# Patient Record
Sex: FEMALE | Race: WHITE | NOT HISPANIC OR LATINO | Employment: OTHER | URBAN - METROPOLITAN AREA
[De-identification: names, ages, dates, MRNs, and addresses within clinical notes are randomized per-mention and may not be internally consistent; named-entity substitution may affect disease eponyms.]

---

## 2018-07-09 ENCOUNTER — CONVERSION ENCOUNTER (OUTPATIENT)
Dept: GENERAL RADIOLOGY | Facility: HOSPITAL | Age: 63
End: 2018-07-09

## 2019-05-29 ENCOUNTER — HOSPITAL ENCOUNTER (OUTPATIENT)
Dept: SURGERY | Facility: HOSPITAL | Age: 64
Setting detail: HOSPITAL OUTPATIENT SURGERY
Discharge: HOME | End: 2019-05-29
Attending: OPHTHALMOLOGY

## 2019-06-19 ENCOUNTER — HOSPITAL ENCOUNTER (OUTPATIENT)
Dept: SURGERY | Facility: HOSPITAL | Age: 64
Setting detail: HOSPITAL OUTPATIENT SURGERY
Discharge: HOME | End: 2019-06-19
Attending: OPHTHALMOLOGY

## 2021-12-28 ENCOUNTER — LAB REQUISITION (OUTPATIENT)
Dept: LAB | Facility: HOSPITAL | Age: 66
End: 2021-12-28

## 2021-12-28 DIAGNOSIS — Z12.4 ENCOUNTER FOR SCREENING FOR MALIGNANT NEOPLASM OF CERVIX: ICD-10-CM

## 2021-12-28 DIAGNOSIS — Z01.419 ENCOUNTER FOR GYNECOLOGICAL EXAMINATION (GENERAL) (ROUTINE) WITHOUT ABNORMAL FINDINGS: ICD-10-CM

## 2021-12-28 PROCEDURE — G0123 SCREEN CERV/VAG THIN LAYER: HCPCS | Performed by: NURSE PRACTITIONER

## 2022-01-03 LAB
CONV .: NORMAL
CYTOLOGIST CVX/VAG CYTO: NORMAL
CYTOLOGY CVX/VAG DOC CYTO: NORMAL
CYTOLOGY CVX/VAG DOC THIN PREP: NORMAL
DX ICD CODE: NORMAL
HIV 1 & 2 AB SER-IMP: NORMAL
OTHER STN SPEC: NORMAL
STAT OF ADQ CVX/VAG CYTO-IMP: NORMAL

## 2022-01-12 ENCOUNTER — OFFICE VISIT (OUTPATIENT)
Dept: OBSTETRICS AND GYNECOLOGY | Facility: CLINIC | Age: 67
End: 2022-01-12

## 2022-01-12 VITALS
HEART RATE: 61 BPM | HEIGHT: 61 IN | WEIGHT: 203 LBS | BODY MASS INDEX: 38.33 KG/M2 | SYSTOLIC BLOOD PRESSURE: 148 MMHG | DIASTOLIC BLOOD PRESSURE: 83 MMHG

## 2022-01-12 DIAGNOSIS — R93.89 THICKENED ENDOMETRIUM: Primary | ICD-10-CM

## 2022-01-12 DIAGNOSIS — N95.0 PMB (POSTMENOPAUSAL BLEEDING): ICD-10-CM

## 2022-01-12 PROCEDURE — 88305 TISSUE EXAM BY PATHOLOGIST: CPT | Performed by: OBSTETRICS & GYNECOLOGY

## 2022-01-12 PROCEDURE — 99213 OFFICE O/P EST LOW 20 MIN: CPT | Performed by: OBSTETRICS & GYNECOLOGY

## 2022-01-12 PROCEDURE — 58100 BIOPSY OF UTERUS LINING: CPT | Performed by: OBSTETRICS & GYNECOLOGY

## 2022-01-12 RX ORDER — LOVASTATIN 20 MG/1
20 TABLET ORAL NIGHTLY
COMMUNITY
Start: 2021-12-28

## 2022-01-12 RX ORDER — CHOLECALCIFEROL (VITAMIN D3) 125 MCG
1 CAPSULE ORAL DAILY
COMMUNITY

## 2022-01-12 RX ORDER — UREA 10 %
1 LOTION (ML) TOPICAL 2 TIMES WEEKLY
COMMUNITY

## 2022-01-12 RX ORDER — ALLOPURINOL 100 MG/1
100 TABLET ORAL NIGHTLY
COMMUNITY
Start: 2021-12-28

## 2022-01-12 RX ORDER — LISINOPRIL AND HYDROCHLOROTHIAZIDE 12.5; 1 MG/1; MG/1
1 TABLET ORAL NIGHTLY
COMMUNITY
Start: 2021-12-28

## 2022-01-12 NOTE — PROGRESS NOTES
"GYN new patient    CC: PMB    Tobacco/Nicotine use:  Yes    HPI:   66 y.o. Contraception or HRT: Post menopausal  Pt states she had 7 days of bleeding over [place].  Has been in menopause for the past 12 years.  No h/o HRT.  No diabetes. No h/o oligomenorrhea.  No h/o abnormal pap smear.  Was never told she had a fibroid in the uterus.  States menses were heavy for 48 hours initially then normal flow    History: PMHx, Meds, Allergies, PSHx, Social Hx, and POBHx all reviewed and updated.  PCP:Ava Darby, APRN      Review of Systems     /83   Pulse 61   Ht 154.9 cm (61\")   Wt 92.1 kg (203 lb)   Breastfeeding No   BMI 38.36 kg/m²     Physical Exam  Vitals and nursing note reviewed. Exam conducted with a chaperone present.   Constitutional:       Appearance: Normal appearance.   Neck:      Thyroid: No thyroid mass or thyromegaly.   Cardiovascular:      Rate and Rhythm: Normal rate and regular rhythm.      Heart sounds: Normal heart sounds.   Pulmonary:      Effort: Pulmonary effort is normal.      Breath sounds: Normal breath sounds.   Abdominal:      General: Abdomen is flat. Bowel sounds are normal.      Palpations: Abdomen is soft.   Genitourinary:     General: Normal vulva.      Exam position: Lithotomy position.      Labia:         Right: No lesion.         Left: No lesion.       Urethra: No prolapse or urethral lesion.      Vagina: Bleeding present.      Cervix: Cervical bleeding present.      Uterus: Enlarged. Not tender and no uterine prolapse.       Adnexa: Right adnexa normal and left adnexa normal.      Comments: Office procedure Timeout was performed  Patient gave written consent for endometrial biopsy  The speculum was placed into the vagina with the above-noted findings  The cervix was prepped with Betadine  Single-tooth tenaculum was applied to the anterior lip of the cervix  The uterus sounded to 10 cm  Multiple passes were made with the endometrial [place]  Specimen was " sent to pathology  All instruments were removed from the vagina excellent hemostasis was observed and the patient tolerated procedure well  Musculoskeletal:      Cervical back: Full passive range of motion without pain.   Neurological:      Mental Status: She is alert.         ASSESSMENT AND PLAN:  Problem Visit    Diagnoses and all orders for this visit:    1. Thickened endometrium (Primary)  Comments:  Endometrial biopsy was performed in the office today.  Discussed the possibility of needing hysteroscopy D&C in the future  Orders:  -     Endometrial Biopsy    2. PMB (postmenopausal bleeding)  Comments:  Endometrial biopsy was performed in the office today  Orders:  -     Endometrial Biopsy    Other orders  -     SCANNED - IMAGING        Counseling:     Patient was counseled regarding risk of infection and bleeding postbiopsy              Follow Up:  Return in about 1 week (around 1/19/2022).        Kimmy Jaffe MD  01/12/2022

## 2022-01-14 LAB
CYTO UR: NORMAL
LAB AP CASE REPORT: NORMAL
LAB AP CLINICAL INFORMATION: NORMAL
PATH REPORT.FINAL DX SPEC: NORMAL
PATH REPORT.GROSS SPEC: NORMAL

## 2022-01-18 ENCOUNTER — OFFICE VISIT (OUTPATIENT)
Dept: OBSTETRICS AND GYNECOLOGY | Facility: CLINIC | Age: 67
End: 2022-01-18

## 2022-01-18 ENCOUNTER — PREP FOR SURGERY (OUTPATIENT)
Dept: OTHER | Facility: HOSPITAL | Age: 67
End: 2022-01-18

## 2022-01-18 VITALS
WEIGHT: 201 LBS | DIASTOLIC BLOOD PRESSURE: 85 MMHG | SYSTOLIC BLOOD PRESSURE: 133 MMHG | BODY MASS INDEX: 37.98 KG/M2 | HEART RATE: 70 BPM

## 2022-01-18 DIAGNOSIS — R93.89 THICKENED ENDOMETRIUM: Primary | ICD-10-CM

## 2022-01-18 DIAGNOSIS — N95.0 PMB (POSTMENOPAUSAL BLEEDING): ICD-10-CM

## 2022-01-18 DIAGNOSIS — R93.89 THICKENED ENDOMETRIUM: ICD-10-CM

## 2022-01-18 DIAGNOSIS — N95.0 PMB (POSTMENOPAUSAL BLEEDING): Primary | ICD-10-CM

## 2022-01-18 LAB
BASOPHILS # BLD AUTO: 0.08 10*3/MM3 (ref 0–0.2)
BASOPHILS NFR BLD AUTO: 0.7 % (ref 0–1.5)
DEPRECATED RDW RBC AUTO: 41.2 FL (ref 37–54)
EOSINOPHIL # BLD AUTO: 0.36 10*3/MM3 (ref 0–0.4)
EOSINOPHIL NFR BLD AUTO: 3.1 % (ref 0.3–6.2)
ERYTHROCYTE [DISTWIDTH] IN BLOOD BY AUTOMATED COUNT: 12 % (ref 12.3–15.4)
HCT VFR BLD AUTO: 52.6 % (ref 34–46.6)
HGB BLD-MCNC: 18 G/DL (ref 12–15.9)
IMM GRANULOCYTES # BLD AUTO: 0.06 10*3/MM3 (ref 0–0.05)
IMM GRANULOCYTES NFR BLD AUTO: 0.5 % (ref 0–0.5)
LYMPHOCYTES # BLD AUTO: 3.96 10*3/MM3 (ref 0.7–3.1)
LYMPHOCYTES NFR BLD AUTO: 33.7 % (ref 19.6–45.3)
MCH RBC QN AUTO: 32 PG (ref 26.6–33)
MCHC RBC AUTO-ENTMCNC: 34.2 G/DL (ref 31.5–35.7)
MCV RBC AUTO: 93.4 FL (ref 79–97)
MONOCYTES # BLD AUTO: 1.04 10*3/MM3 (ref 0.1–0.9)
MONOCYTES NFR BLD AUTO: 8.9 % (ref 5–12)
NEUTROPHILS NFR BLD AUTO: 53.1 % (ref 42.7–76)
NEUTROPHILS NFR BLD AUTO: 6.24 10*3/MM3 (ref 1.7–7)
NRBC BLD AUTO-RTO: 0 /100 WBC (ref 0–0.2)
PLATELET # BLD AUTO: 363 10*3/MM3 (ref 140–450)
PMV BLD AUTO: 11 FL (ref 6–12)
RBC # BLD AUTO: 5.63 10*6/MM3 (ref 3.77–5.28)
WBC NRBC COR # BLD: 11.74 10*3/MM3 (ref 3.4–10.8)

## 2022-01-18 PROCEDURE — 99214 OFFICE O/P EST MOD 30 MIN: CPT | Performed by: OBSTETRICS & GYNECOLOGY

## 2022-01-18 PROCEDURE — 85025 COMPLETE CBC W/AUTO DIFF WBC: CPT | Performed by: OBSTETRICS & GYNECOLOGY

## 2022-01-18 RX ORDER — SODIUM CHLORIDE, SODIUM LACTATE, POTASSIUM CHLORIDE, CALCIUM CHLORIDE 600; 310; 30; 20 MG/100ML; MG/100ML; MG/100ML; MG/100ML
125 INJECTION, SOLUTION INTRAVENOUS CONTINUOUS
Status: CANCELLED | OUTPATIENT
Start: 2022-01-18

## 2022-01-18 RX ORDER — SODIUM CHLORIDE 0.9 % (FLUSH) 0.9 %
3 SYRINGE (ML) INJECTION EVERY 12 HOURS SCHEDULED
Status: CANCELLED | OUTPATIENT
Start: 2022-01-18

## 2022-01-18 RX ORDER — CEFAZOLIN SODIUM 2 G/100ML
2 INJECTION, SOLUTION INTRAVENOUS ONCE
Status: CANCELLED | OUTPATIENT
Start: 2022-01-18 | End: 2022-01-18

## 2022-01-18 RX ORDER — ACETAMINOPHEN 500 MG
1000 TABLET ORAL ONCE
Status: CANCELLED | OUTPATIENT
Start: 2022-01-18 | End: 2022-01-18

## 2022-01-18 RX ORDER — SODIUM CHLORIDE 0.9 % (FLUSH) 0.9 %
10 SYRINGE (ML) INJECTION AS NEEDED
Status: CANCELLED | OUTPATIENT
Start: 2022-01-18

## 2022-01-18 NOTE — ASSESSMENT & PLAN NOTE
Explained the need for tissue diagnosis as endometrial biopsy performed on 1/12/2022 showed only endocervical tissue

## 2022-01-18 NOTE — PROGRESS NOTES
Date: 22   Pre-Operative Visit  Patient Name: Maritza Armstrong         : 1955    Allergies: No Known Allergies    Age: 66 y.o.   A  LMP: No LMP recorded. Patient is postmenopausal. Birth Control: Post menopausal    CC:   Chief Complaint   Patient presents with   • FU EMBX results   Postmenopausal bleeding    HPI:  Patient had an endometrial biopsy performed last week in the office. The biopsy contains mostly blood and endocervical tissue. No endometrial tissue was obtained. There is no evidence of hyperplasia or malignancy on the tissue obtained. Patient states she is continuing to have a light amount of bleeding.    Past Medical History:   Diagnosis Date   • Anemia    • Gout    • Hyperlipidemia    • Hypertension        [x] Reviewed intake/office visit 2022  [x] Reviewed labs, US/imaging, pap, pathology 2022    Vitals: /85   Pulse 70   Wt 91.2 kg (201 lb)   BMI 37.98 kg/m²      Physical Exam  Vitals and nursing note reviewed.   Constitutional:       Appearance: Normal appearance.   Cardiovascular:      Rate and Rhythm: Normal rate and regular rhythm.      Heart sounds: Normal heart sounds.   Pulmonary:      Effort: Pulmonary effort is normal.      Breath sounds: Normal breath sounds.   Abdominal:      General: Abdomen is flat. Bowel sounds are normal.      Palpations: Abdomen is soft.   Neurological:      Mental Status: She is alert.         Diagnoses and all orders for this visit:    1. Thickened endometrium (Primary)  Assessment & Plan:  Explained the need for tissue diagnosis as endometrial biopsy performed on 2022 showed only endocervical tissue    Orders:  -     CBC and Differential    2. PMB (postmenopausal bleeding)  Assessment & Plan:  Endometrial biopsy did not have endometrial tissue  Counseled the patient and her daughter at length regarding the risks and benefits of hysteroscopy D&C as well as the need to proceed with hysteroscopy D&C    Orders:  -     CBC and  Differential    Reviewed planned procedure in detail along with risks, benefits, alternatives, side-effects and efficacy.  Appropriate surgical and postop expectations reviewed.  See separate counseling note.      [] Preoperative Clearance      Follow-up: Return for 2 weeks post op.               Kimmy Jaffe MD

## 2022-01-18 NOTE — ASSESSMENT & PLAN NOTE
Endometrial biopsy did not have endometrial tissue  Counseled the patient and her daughter at length regarding the risks and benefits of hysteroscopy D&C as well as the need to proceed with hysteroscopy D&C

## 2022-01-28 ENCOUNTER — PRE-ADMISSION TESTING (OUTPATIENT)
Dept: PREADMISSION TESTING | Facility: HOSPITAL | Age: 67
End: 2022-01-28

## 2022-01-28 ENCOUNTER — LAB (OUTPATIENT)
Dept: LAB | Facility: HOSPITAL | Age: 67
End: 2022-01-28

## 2022-01-28 VITALS
OXYGEN SATURATION: 94 % | RESPIRATION RATE: 18 BRPM | HEIGHT: 61 IN | SYSTOLIC BLOOD PRESSURE: 116 MMHG | BODY MASS INDEX: 38.46 KG/M2 | TEMPERATURE: 97.8 F | HEART RATE: 58 BPM | DIASTOLIC BLOOD PRESSURE: 68 MMHG | WEIGHT: 203.71 LBS

## 2022-01-28 DIAGNOSIS — R93.89 THICKENED ENDOMETRIUM: ICD-10-CM

## 2022-01-28 DIAGNOSIS — N95.0 PMB (POSTMENOPAUSAL BLEEDING): Primary | ICD-10-CM

## 2022-01-28 DIAGNOSIS — N95.0 PMB (POSTMENOPAUSAL BLEEDING): ICD-10-CM

## 2022-01-28 LAB
BILIRUB UR QL STRIP: NEGATIVE
CLARITY UR: CLEAR
COLOR UR: YELLOW
GLUCOSE UR STRIP-MCNC: NEGATIVE MG/DL
HGB UR QL STRIP.AUTO: NEGATIVE
KETONES UR QL STRIP: NEGATIVE
LEUKOCYTE ESTERASE UR QL STRIP.AUTO: NEGATIVE
NITRITE UR QL STRIP: NEGATIVE
PH UR STRIP.AUTO: 7 [PH] (ref 5–8)
PROT UR QL STRIP: NEGATIVE
SP GR UR STRIP: 1.01 (ref 1–1.03)
UROBILINOGEN UR QL STRIP: NORMAL

## 2022-01-28 PROCEDURE — U0005 INFEC AGEN DETEC AMPLI PROBE: HCPCS

## 2022-01-28 PROCEDURE — U0004 COV-19 TEST NON-CDC HGH THRU: HCPCS

## 2022-01-28 PROCEDURE — 81003 URINALYSIS AUTO W/O SCOPE: CPT

## 2022-01-28 PROCEDURE — C9803 HOPD COVID-19 SPEC COLLECT: HCPCS

## 2022-01-28 RX ORDER — IBUPROFEN 800 MG/1
800 TABLET ORAL EVERY 8 HOURS PRN
COMMUNITY

## 2022-01-29 LAB — SARS-COV-2 RNA PNL SPEC NAA+PROBE: NOT DETECTED

## 2022-02-02 PROCEDURE — S0260 H&P FOR SURGERY: HCPCS | Performed by: OBSTETRICS & GYNECOLOGY

## 2022-02-02 NOTE — H&P
[facility]   PREOPERATIVE HISTORY AND PHYSICAL    Patient Name:Maritza Armstrong  : 1955  MRN: [id number]  Primary Care Physician: Ava Darby APRN  Date of admission: (Not on file)    Subjective   Subjective     Chief Complaint: preoperative evaluation for PMB and thickened endometrium    History of Present Illness  Maritza Armstorng is a 66 y.o. female who presents for preoperative evaluation. She is scheduled for HYSTEROSCOPY ENDOMETRIAL BIOPSY / POLYPECTOMY (N/A)    Review of Systems     Personal History     Past Medical History:   Diagnosis Date   • Elevated hemoglobin (HCC)     HX OF    • Gout    • Hyperlipidemia    • Hypertension    • Low iron     HX OF, CURRENTLY TAKES IRON TABS   • PMB (postmenopausal bleeding)    • Thickened endometrium        Past Surgical History:   Procedure Laterality Date   •  SECTION     • D & C WITH SUCTION     • FINGER SURGERY Bilateral    • WISDOM TOOTH EXTRACTION         Family History: Her family history is not on file.     Social History: She  reports that she has been smoking. She has a 67.50 pack-year smoking history. She has never used smokeless tobacco. She reports previous alcohol use. She reports that she does not use drugs.    Home Medications:  Vitamin D3, allopurinol, ferrous sulfate, ibuprofen, lisinopril-hydrochlorothiazide, and lovastatin    Allergies:  She has No Known Allergies.    Objective    Objective     Vitals:         Physical Exam  Constitutional:       Appearance: Normal appearance.   Cardiovascular:      Rate and Rhythm: Normal rate and regular rhythm.      Heart sounds: Normal heart sounds.   Pulmonary:      Effort: Pulmonary effort is normal.      Breath sounds: Normal breath sounds.   Abdominal:      General: Abdomen is flat. Bowel sounds are normal.      Palpations: Abdomen is soft.   Neurological:      Mental Status: She is alert.         Assessment/Plan   Assessment / Plan     Brief Patient Summary:  Mraitza Armstrong is a 66  y.o. female who presents for preoperative evaluation.    Pre-Op Diagnosis Codes:     * PMB (postmenopausal bleeding) [N95.0]     * Thickened endometrium [R93.89]    Active Hospital Problems:  Active Hospital Problems    Diagnosis    • PMB (postmenopausal bleeding)    • Thickened endometrium      Plan:   Procedure(s):  HYSTEROSCOPY ENDOMETRIAL BIOPSY / POLYPECTOMY    The risks, benefits, and alternatives of the procedure including but not limited to risk of infection, uterine perforation, bleeding, and need for further surgery and risks of the anesthesia were discussed in detail with the patient and questions were answered. No guarantees were made or implied. Informed consent was obtained.    Kimmy Jaffe MD

## 2022-02-03 ENCOUNTER — ANESTHESIA (OUTPATIENT)
Dept: PERIOP | Facility: HOSPITAL | Age: 67
End: 2022-02-03

## 2022-02-03 ENCOUNTER — HOSPITAL ENCOUNTER (OUTPATIENT)
Facility: HOSPITAL | Age: 67
Setting detail: HOSPITAL OUTPATIENT SURGERY
Discharge: HOME | End: 2022-02-03
Attending: OBSTETRICS & GYNECOLOGY | Admitting: OBSTETRICS & GYNECOLOGY

## 2022-02-03 ENCOUNTER — ANESTHESIA EVENT (OUTPATIENT)
Dept: PERIOP | Facility: HOSPITAL | Age: 67
End: 2022-02-03

## 2022-02-03 VITALS
HEART RATE: 66 BPM | BODY MASS INDEX: 37.2 KG/M2 | TEMPERATURE: 97.6 F | HEIGHT: 62 IN | DIASTOLIC BLOOD PRESSURE: 60 MMHG | SYSTOLIC BLOOD PRESSURE: 118 MMHG | WEIGHT: 202.16 LBS | OXYGEN SATURATION: 96 % | RESPIRATION RATE: 14 BRPM

## 2022-02-03 DIAGNOSIS — N95.0 PMB (POSTMENOPAUSAL BLEEDING): ICD-10-CM

## 2022-02-03 DIAGNOSIS — R93.89 THICKENED ENDOMETRIUM: ICD-10-CM

## 2022-02-03 PROCEDURE — 58558 HYSTEROSCOPY BIOPSY: CPT | Performed by: OBSTETRICS & GYNECOLOGY

## 2022-02-03 PROCEDURE — 25010000002 METOCLOPRAMIDE PER 10 MG: Performed by: ANESTHESIOLOGY

## 2022-02-03 PROCEDURE — 25010000002 PROPOFOL 10 MG/ML EMULSION: Performed by: NURSE ANESTHETIST, CERTIFIED REGISTERED

## 2022-02-03 PROCEDURE — 25010000002 MIDAZOLAM PER 1 MG: Performed by: ANESTHESIOLOGY

## 2022-02-03 PROCEDURE — 88305 TISSUE EXAM BY PATHOLOGIST: CPT | Performed by: OBSTETRICS & GYNECOLOGY

## 2022-02-03 PROCEDURE — 0 CEFAZOLIN IN DEXTROSE 2-4 GM/100ML-% SOLUTION: Performed by: OBSTETRICS & GYNECOLOGY

## 2022-02-03 RX ORDER — PROMETHAZINE HYDROCHLORIDE 12.5 MG/1
25 TABLET ORAL ONCE AS NEEDED
Status: DISCONTINUED | OUTPATIENT
Start: 2022-02-03 | End: 2022-02-03 | Stop reason: HOSPADM

## 2022-02-03 RX ORDER — SODIUM CHLORIDE 0.9 % (FLUSH) 0.9 %
3 SYRINGE (ML) INJECTION EVERY 12 HOURS SCHEDULED
Status: DISCONTINUED | OUTPATIENT
Start: 2022-02-03 | End: 2022-02-03 | Stop reason: HOSPADM

## 2022-02-03 RX ORDER — MAGNESIUM HYDROXIDE 1200 MG/15ML
LIQUID ORAL AS NEEDED
Status: DISCONTINUED | OUTPATIENT
Start: 2022-02-03 | End: 2022-02-03 | Stop reason: HOSPADM

## 2022-02-03 RX ORDER — ACETAMINOPHEN 500 MG
1000 TABLET ORAL ONCE
Status: DISCONTINUED | OUTPATIENT
Start: 2022-02-03 | End: 2022-02-03 | Stop reason: HOSPADM

## 2022-02-03 RX ORDER — OXYCODONE HYDROCHLORIDE AND ACETAMINOPHEN 5; 325 MG/1; MG/1
1 TABLET ORAL ONCE AS NEEDED
Status: DISCONTINUED | OUTPATIENT
Start: 2022-02-03 | End: 2022-02-03 | Stop reason: HOSPADM

## 2022-02-03 RX ORDER — PROMETHAZINE HYDROCHLORIDE 25 MG/1
25 SUPPOSITORY RECTAL ONCE AS NEEDED
Status: DISCONTINUED | OUTPATIENT
Start: 2022-02-03 | End: 2022-02-03 | Stop reason: HOSPADM

## 2022-02-03 RX ORDER — LIDOCAINE HYDROCHLORIDE 20 MG/ML
INJECTION, SOLUTION INFILTRATION; PERINEURAL AS NEEDED
Status: DISCONTINUED | OUTPATIENT
Start: 2022-02-03 | End: 2022-02-03 | Stop reason: SURG

## 2022-02-03 RX ORDER — MEPERIDINE HYDROCHLORIDE 25 MG/ML
12.5 INJECTION INTRAMUSCULAR; INTRAVENOUS; SUBCUTANEOUS
Status: DISCONTINUED | OUTPATIENT
Start: 2022-02-03 | End: 2022-02-03 | Stop reason: HOSPADM

## 2022-02-03 RX ORDER — METOCLOPRAMIDE HYDROCHLORIDE 5 MG/ML
10 INJECTION INTRAMUSCULAR; INTRAVENOUS ONCE AS NEEDED
Status: COMPLETED | OUTPATIENT
Start: 2022-02-03 | End: 2022-02-03

## 2022-02-03 RX ORDER — GLYCOPYRROLATE 0.2 MG/ML
0.2 INJECTION INTRAMUSCULAR; INTRAVENOUS
Status: COMPLETED | OUTPATIENT
Start: 2022-02-03 | End: 2022-02-03

## 2022-02-03 RX ORDER — KETAMINE HCL IN NACL, ISO-OSM 100MG/10ML
SYRINGE (ML) INJECTION AS NEEDED
Status: DISCONTINUED | OUTPATIENT
Start: 2022-02-03 | End: 2022-02-03 | Stop reason: SURG

## 2022-02-03 RX ORDER — FAMOTIDINE 10 MG/ML
20 INJECTION, SOLUTION INTRAVENOUS
Status: COMPLETED | OUTPATIENT
Start: 2022-02-03 | End: 2022-02-03

## 2022-02-03 RX ORDER — SODIUM CHLORIDE, SODIUM LACTATE, POTASSIUM CHLORIDE, CALCIUM CHLORIDE 600; 310; 30; 20 MG/100ML; MG/100ML; MG/100ML; MG/100ML
9 INJECTION, SOLUTION INTRAVENOUS CONTINUOUS PRN
Status: DISCONTINUED | OUTPATIENT
Start: 2022-02-03 | End: 2022-02-03 | Stop reason: HOSPADM

## 2022-02-03 RX ORDER — MIDAZOLAM HYDROCHLORIDE 1 MG/ML
2 INJECTION INTRAMUSCULAR; INTRAVENOUS ONCE
Status: COMPLETED | OUTPATIENT
Start: 2022-02-03 | End: 2022-02-03

## 2022-02-03 RX ORDER — SODIUM CHLORIDE, SODIUM LACTATE, POTASSIUM CHLORIDE, CALCIUM CHLORIDE 600; 310; 30; 20 MG/100ML; MG/100ML; MG/100ML; MG/100ML
125 INJECTION, SOLUTION INTRAVENOUS CONTINUOUS
Status: DISCONTINUED | OUTPATIENT
Start: 2022-02-03 | End: 2022-02-03 | Stop reason: HOSPADM

## 2022-02-03 RX ORDER — ONDANSETRON 2 MG/ML
4 INJECTION INTRAMUSCULAR; INTRAVENOUS ONCE AS NEEDED
Status: DISCONTINUED | OUTPATIENT
Start: 2022-02-03 | End: 2022-02-03 | Stop reason: HOSPADM

## 2022-02-03 RX ORDER — SODIUM CHLORIDE 0.9 % (FLUSH) 0.9 %
10 SYRINGE (ML) INJECTION AS NEEDED
Status: DISCONTINUED | OUTPATIENT
Start: 2022-02-03 | End: 2022-02-03 | Stop reason: HOSPADM

## 2022-02-03 RX ORDER — ACETAMINOPHEN 500 MG
1000 TABLET ORAL ONCE
Status: COMPLETED | OUTPATIENT
Start: 2022-02-03 | End: 2022-02-03

## 2022-02-03 RX ORDER — CEFAZOLIN SODIUM 2 G/100ML
2 INJECTION, SOLUTION INTRAVENOUS ONCE
Status: COMPLETED | OUTPATIENT
Start: 2022-02-03 | End: 2022-02-03

## 2022-02-03 RX ORDER — OXYCODONE HYDROCHLORIDE 5 MG/1
5 TABLET ORAL
Status: DISCONTINUED | OUTPATIENT
Start: 2022-02-03 | End: 2022-02-03 | Stop reason: HOSPADM

## 2022-02-03 RX ORDER — BUPIVACAINE HYDROCHLORIDE AND EPINEPHRINE 5; 5 MG/ML; UG/ML
INJECTION, SOLUTION EPIDURAL; INTRACAUDAL; PERINEURAL AS NEEDED
Status: DISCONTINUED | OUTPATIENT
Start: 2022-02-03 | End: 2022-02-03 | Stop reason: HOSPADM

## 2022-02-03 RX ADMIN — SODIUM CHLORIDE, POTASSIUM CHLORIDE, SODIUM LACTATE AND CALCIUM CHLORIDE 9 ML/HR: 600; 310; 30; 20 INJECTION, SOLUTION INTRAVENOUS at 06:40

## 2022-02-03 RX ADMIN — FAMOTIDINE 20 MG: 10 INJECTION INTRAVENOUS at 06:58

## 2022-02-03 RX ADMIN — LIDOCAINE HYDROCHLORIDE 100 MG: 20 INJECTION, SOLUTION INFILTRATION; PERINEURAL at 07:33

## 2022-02-03 RX ADMIN — MIDAZOLAM HYDROCHLORIDE 2 MG: 1 INJECTION, SOLUTION INTRAMUSCULAR; INTRAVENOUS at 07:23

## 2022-02-03 RX ADMIN — METOCLOPRAMIDE 10 MG: 5 INJECTION, SOLUTION INTRAMUSCULAR; INTRAVENOUS at 07:23

## 2022-02-03 RX ADMIN — PROPOFOL 200 MCG/KG/MIN: 10 INJECTION, EMULSION INTRAVENOUS at 07:33

## 2022-02-03 RX ADMIN — Medication 50 MG: at 07:33

## 2022-02-03 RX ADMIN — ACETAMINOPHEN 1000 MG: 500 TABLET ORAL at 06:58

## 2022-02-03 RX ADMIN — CEFAZOLIN SODIUM 2 G: 2 INJECTION, SOLUTION INTRAVENOUS at 07:32

## 2022-02-03 RX ADMIN — OXYCODONE HYDROCHLORIDE 5 MG: 5 TABLET ORAL at 08:27

## 2022-02-03 RX ADMIN — GLYCOPYRROLATE 0.2 MG: 0.2 INJECTION INTRAMUSCULAR; INTRAVENOUS at 07:23

## 2022-02-03 NOTE — DISCHARGE INSTRUCTIONS
DISCHARGE INSTRUCTIONS  SURGICAL / AMBULATORY  PROCEDURES Maritza Armstrong     ? For your surgery you had:  ? General anesthesia (you may have a sore throat for the first 24 hours)  ? IV sedation.  ? Local anesthesia  ? Monitored anesthesia Care  ? You received a medicated patch for nausea prevention today (behind your ear). It is recommended that you remove it 24-48 hours post-operatively. It must be removed within 72 hours.   ? You have received an anesthesia medication today that can cause hormonal forms of birth control to be ineffective. You should use a different form of birth control (to prevent pregnancy) for 7 days.   ? You may experience dizziness, drowsiness, or light-headedness for several hours following surgery/procedure.  ? Do not stay alone today or tonight.  ? Limit your activity for 24 hours.  ? Resume your diet slowly.  Follow whatever special dietary instructions you may have been given by your doctor.  ? You should not drive or operate machinery, drink alcohol, or sign legally binding documents for 24 hours or while you are taking pain medication.    NOTIFY YOUR DOCTOR IF YOU EXPERIENCE ANY OF THE FOLLOWING:  ? Temperature greater than 101 degrees Fahrenheit  ? Shaking Chills  ? Redness or excessive drainage from incision  ? Nausea, vomiting and/or pain that is not controlled by prescribed medications  ? Increase in bleeding or bleeding that is excessive  ? Unable to urinate in 6 hours after surgery  ? If unable to reach your doctor, please go to the closest Emergency Room    Bleeding and cramping equivalent to a menstrual cramps will occur for next 48 h.    Warm showers may help    Try to limit lifting heavier items for the next couple days.    Ibuprofen 600 mg every 8 h for 3 days then stop    Last dose of pain medication was given at: 0830

## 2022-02-03 NOTE — ANESTHESIA PREPROCEDURE EVALUATION
Anesthesia Evaluation     Patient summary reviewed and Nursing notes reviewed   history of anesthetic complications: difficult airway               Airway   Mallampati: III  TM distance: >3 FB  Neck ROM: full  No difficulty expected and Difficult intubation highly probable  Dental      Pulmonary - negative pulmonary ROS and normal exam    breath sounds clear to auscultation  Cardiovascular - normal exam    Rhythm: regular  Rate: normal    (+) hypertension,       Neuro/Psych- negative ROS  GI/Hepatic/Renal/Endo    (+) obesity,       Musculoskeletal (-) negative ROS    Abdominal    Substance History - negative use     OB/GYN negative ob/gyn ROS         Other                      Anesthesia Plan    ASA 2     general     intravenous induction     Anesthetic plan, all risks, benefits, and alternatives have been provided, discussed and informed consent has been obtained with: patient.        CODE STATUS:

## 2022-02-03 NOTE — OP NOTE
DILATATION AND CURETTAGE HYSTEROSCOPY WITH MYOSURE  Procedure Report    Patient Name:  Maritza Armstrong  YOB: 1955    Date of Surgery:  2/3/2022         Pre-op Diagnosis:   PMB (postmenopausal bleeding) [N95.0]  Thickened endometrium [R93.89]       Post-Op Diagnosis Codes:     * PMB (postmenopausal bleeding) [N95.0]     * Thickened endometrium [R93.89]    Procedure/CPT® Codes:      Procedure(s):  HYSTEROSCOPY, DILATION AND CURETTAGE    Staff:  Surgeon(s):  Kimmy Jaffe MD         Anesthesia: Choice    Estimated Blood Loss: minimal        Specimen:          Specimens     ID Source Type Tests Collected By Collected At Frozen?    A Uterus Tissue · TISSUE PATHOLOGY EXAM   Kimmy Jaffe MD 2/3/22 2093     Description: ENDOMETRIAL CURETTINGS               Findings: Endometrial cavity grossly normal.  Bilateral tubal ostia were visualized and there is no evidence of uterine perforation.  Endometrium was atrophic.  No evidence of endometrial polyp    Complications: None    Description of Procedure: Pt was taken to the operating room and placed under General anesthesia via LMA. She was placed in low dorsal lithotomy in yellow fin stirrups and prepped and draped in usual sterile fashion. I was called to the room following completion of draping. A surgical time out was performed. A speculum was placed in the vagina and the anterior lip of the cervix was grasped with a single tooth tenaculum. The uterus was gently sounded to a depth of 6. A paracervical block was performed with half percent Marcaine with epinephrine approximately 5 mL bilaterally.  The hysteroscope was gently introduced into the endometrial cavity with the above noted findings. The hysteroscope was withdrawn and multiple passes were made with a sharp curette.  The single-tooth tenaculum was removed from the anterior lip of the cervix.  The speculum was withdrawn.  Excellent hemostasis was observed. All instruments were removed from the  vagina. All lap, sponge, and needle counts were correct x 3. The pt tolerated the procedure well and went to the recovery room in stable condition.       Kimmy Jaffe MD     Date: 2/3/2022  Time: 08:15 EST

## 2022-02-03 NOTE — ANESTHESIA POSTPROCEDURE EVALUATION
Patient: Maritza Armstrong    Procedure Summary     Date: 02/03/22 Room / Location: [facility] OR 02 / [facility] MAIN OR    Anesthesia Start: 0727 Anesthesia Stop: 0808    Procedure: HYSTEROSCOPY, DILATION AND CURETTAGE (N/A Vagina) Diagnosis:       PMB (postmenopausal bleeding)      Thickened endometrium      (PMB (postmenopausal bleeding) [N95.0])      (Thickened endometrium [R93.89])    Surgeons: Kimmy Jaffe MD Provider: Satnam Le MD    Anesthesia Type: general ASA Status: 2          Anesthesia Type: general    Vitals  Vitals Value Taken Time   /62 02/03/22 0848   Temp 36.4 °C (97.5 °F) 02/03/22 0848   Pulse 67 02/03/22 0848   Resp 16 02/03/22 0848   SpO2 96 % 02/03/22 0848           Post Anesthesia Care and Evaluation    Patient location during evaluation: bedside  Patient participation: complete - patient participated  Level of consciousness: awake  Pain management: adequate  Airway patency: patent  Anesthetic complications: No anesthetic complications  PONV Status: none  Cardiovascular status: acceptable and stable  Respiratory status: acceptable  Hydration status: acceptable    Comments: An Anesthesiologist personally participated in the most demanding procedures (including induction and emergence if applicable) in the anesthesia plan, monitored the course of anesthesia administration at frequent intervals and remained physically present and available for immediate diagnosis and treatment of emergencies.

## 2022-02-16 ENCOUNTER — OFFICE VISIT (OUTPATIENT)
Dept: OBSTETRICS AND GYNECOLOGY | Facility: CLINIC | Age: 67
End: 2022-02-16

## 2022-02-16 VITALS
BODY MASS INDEX: 36.65 KG/M2 | SYSTOLIC BLOOD PRESSURE: 121 MMHG | WEIGHT: 200.4 LBS | DIASTOLIC BLOOD PRESSURE: 77 MMHG | HEART RATE: 67 BPM

## 2022-02-16 DIAGNOSIS — R93.89 THICKENED ENDOMETRIUM: ICD-10-CM

## 2022-02-16 DIAGNOSIS — N95.0 PMB (POSTMENOPAUSAL BLEEDING): Primary | ICD-10-CM

## 2022-02-16 PROCEDURE — 99212 OFFICE O/P EST SF 10 MIN: CPT | Performed by: OBSTETRICS & GYNECOLOGY

## 2022-02-16 NOTE — PROGRESS NOTES
Post Op Office Visit  CC:    Chief Complaint   Patient presents with   • Post-op      PMB  Thickened endometrium    HPI:  No complaints  Operative report, surgical findings and any pathology reviewed.    PHYSICAL EXAM:  /77   Pulse 67   Wt 90.9 kg (200 lb 6.4 oz)   BMI 36.65 kg/m²   General- NAD, alert and oriented, appropriate  Psych- Normal mood, good memory    Abdomen- Soft, non distended, non tender, no masses  Incision/s-  Clean, dry, intact, healing well     ASSESSMENT and PLAN:  Post-operative exam    Diagnoses and all orders for this visit:    1. PMB (postmenopausal bleeding) (Primary)  Assessment & Plan:  Has had some light bleeding since D&C  No bleeding at this time  Pathology benign      2. Thickened endometrium  Assessment & Plan:  No endometrial polyp at time of surgery  Pathology benign            Any available photos and/or pathology were reviewed.  All questions answered.     Ok to resume normal activities  Ok to resume intercourse  Return to school/work without limitations    Counseling:         Follow Up:  No follow-ups on file.            Kimmy Jaffe MD  02/16/2022

## 2022-08-09 ENCOUNTER — APPOINTMENT (OUTPATIENT)
Dept: GENERAL RADIOLOGY | Facility: HOSPITAL | Age: 67
End: 2022-08-09

## 2022-08-09 ENCOUNTER — HOSPITAL ENCOUNTER (EMERGENCY)
Facility: HOSPITAL | Age: 67
Discharge: HOME | End: 2022-08-09
Attending: STUDENT IN AN ORGANIZED HEALTH CARE EDUCATION/TRAINING PROGRAM | Admitting: STUDENT IN AN ORGANIZED HEALTH CARE EDUCATION/TRAINING PROGRAM

## 2022-08-09 ENCOUNTER — APPOINTMENT (OUTPATIENT)
Dept: CT IMAGING | Facility: HOSPITAL | Age: 67
End: 2022-08-09

## 2022-08-09 VITALS
RESPIRATION RATE: 20 BRPM | SYSTOLIC BLOOD PRESSURE: 163 MMHG | BODY MASS INDEX: 35.94 KG/M2 | TEMPERATURE: 98.8 F | HEART RATE: 75 BPM | OXYGEN SATURATION: 96 % | DIASTOLIC BLOOD PRESSURE: 92 MMHG | HEIGHT: 63 IN | WEIGHT: 202.82 LBS

## 2022-08-09 DIAGNOSIS — S09.90XA INJURY OF HEAD, INITIAL ENCOUNTER: ICD-10-CM

## 2022-08-09 DIAGNOSIS — M25.531 RIGHT WRIST PAIN: ICD-10-CM

## 2022-08-09 DIAGNOSIS — T07.XXXA ABRASIONS OF MULTIPLE SITES: ICD-10-CM

## 2022-08-09 DIAGNOSIS — S50.01XA CONTUSION OF RIGHT ELBOW, INITIAL ENCOUNTER: ICD-10-CM

## 2022-08-09 DIAGNOSIS — Z23 NEED FOR TDAP VACCINATION: ICD-10-CM
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
## 2022-08-09 LAB
HOLD SPECIMEN: NORMAL
HOLD SPECIMEN: NORMAL
WHOLE BLOOD HOLD COAG: NORMAL
WHOLE BLOOD HOLD SPECIMEN: NORMAL

## 2022-08-09 PROCEDURE — 70450 CT HEAD/BRAIN W/O DYE: CPT

## 2022-08-09 PROCEDURE — 25010000002 TETANUS-DIPHTH-ACELL PERTUSSIS 5-2.5-18.5 LF-MCG/0.5 SUSPENSION PREFILLED SYRINGE: Performed by: REGISTERED NURSE

## 2022-08-09 PROCEDURE — 73080 X-RAY EXAM OF ELBOW: CPT

## 2022-08-09 PROCEDURE — 73120 X-RAY EXAM OF HAND: CPT

## 2022-08-09 PROCEDURE — 36415 COLL VENOUS BLD VENIPUNCTURE: CPT | Performed by: STUDENT IN AN ORGANIZED HEALTH CARE EDUCATION/TRAINING PROGRAM

## 2022-08-09 PROCEDURE — 90715 TDAP VACCINE 7 YRS/> IM: CPT | Performed by: REGISTERED NURSE

## 2022-08-09 PROCEDURE — 99283 EMERGENCY DEPT VISIT LOW MDM: CPT

## 2022-08-09 PROCEDURE — 90471 IMMUNIZATION ADMIN: CPT | Performed by: REGISTERED NURSE

## 2022-08-09 RX ORDER — IBUPROFEN 600 MG/1
600 TABLET ORAL ONCE
Status: COMPLETED | OUTPATIENT
Start: 2022-08-09 | End: 2022-08-09

## 2022-08-09 RX ORDER — SODIUM CHLORIDE 0.9 % (FLUSH) 0.9 %
10 SYRINGE (ML) INJECTION AS NEEDED
Status: DISCONTINUED | OUTPATIENT
Start: 2022-08-09 | End: 2022-08-09 | Stop reason: HOSPADM

## 2022-08-09 RX ORDER — GINSENG 100 MG
1 CAPSULE ORAL ONCE
Status: COMPLETED | OUTPATIENT
Start: 2022-08-09 | End: 2022-08-09

## 2022-08-09 RX ORDER — GINSENG 100 MG
1 CAPSULE ORAL 2 TIMES DAILY
Qty: 28 G | Refills: 0 | Status: SHIPPED | OUTPATIENT
Start: 2022-08-09

## 2022-08-09 RX ADMIN — IBUPROFEN 600 MG: 600 TABLET, FILM COATED ORAL at 17:36

## 2022-08-09 RX ADMIN — Medication 1 APPLICATION: at 18:57

## 2022-08-09 RX ADMIN — TETANUS TOXOID, REDUCED DIPHTHERIA TOXOID AND ACELLULAR PERTUSSIS VACCINE, ADSORBED 0.5 ML: 5; 2.5; 8; 8; 2.5 SUSPENSION INTRAMUSCULAR at 17:36

## 2022-08-09 RX ADMIN — Medication 1 APPLICATION: at 18:34

## 2022-08-09 NOTE — ED PROVIDER NOTES
Time: 4:10 PM EDT  Arrived by: private car  Chief Complaint: Fall  History provided by: Patient      History of Present Illness:  Patient is a 67 y.o. year old female who presents to the emergency department after a fall last night around 8:30 PM.  Patient states she was walking her dog who proceeded to run after her dog, pulled her off her feet and she landed on her right arm and hit her head on the pavement.  She was seen at [facility] and advised to present to ED for further evaluation.  Patient is concerned of head injury though she denies loss of consciousness or headache at this time.  She complains of right wrist and right elbow pain and has multiple abrasions on her hand and posterior forearm/elbow.  She has taken Tylenol last night with some relief.  Her tetanus is unknown.      HPI    Similar Symptoms Previously: No  Recently seen: Yes, [facility] today      Patient Care Team  Primary Care Provider: Ava Darby APRN    Past Medical History:     No Known Allergies  Past Medical History:   Diagnosis Date   • Elevated hemoglobin (HCC)     HX OF    • Gout    • Hyperlipidemia    • Hypertension    • Low iron     HX OF, CURRENTLY TAKES IRON TABS   • PMB (postmenopausal bleeding)    • Thickened endometrium      Past Surgical History:   Procedure Laterality Date   •  SECTION     • D & C HYSTEROSCOPY MYOSURE N/A 2/3/2022    Procedure: HYSTEROSCOPY, DILATION AND CURETTAGE;  Surgeon: Kimmy Jaffe MD;  Location: [facility] OR;  Service: Gynecology;  Laterality: N/A;   • D & C WITH SUCTION     • FINGER SURGERY Bilateral    • WISDOM TOOTH EXTRACTION       History reviewed. No pertinent family history.    Home Medications:  Prior to Admission medications    Medication Sig Start Date End Date Taking? Authorizing Provider   allopurinol (ZYLOPRIM) 100 MG tablet Take 100 mg by mouth Every Night. 21   Provider, MD Radha   Cholecalciferol (Vitamin D3) 50  "MCG (2000 UT) tablet Take 1 tablet by mouth Daily.    Radha Jarquin MD   ferrous sulfate 140 (45 Fe) MG tablet controlled-release tablet Take 1 tablet by mouth 2 (Two) Times a Week.    Radha Jarquin MD   ibuprofen (ADVIL,MOTRIN) 800 MG tablet Take 800 mg by mouth Every 8 (Eight) Hours As Needed for Mild Pain .    Radha Jarquin MD   lisinopril-hydrochlorothiazide (PRINZIDE,ZESTORETIC) 10-12.5 MG per tablet Take 1 tablet by mouth Every Night. 12/28/21   Radha Jarquin MD   lovastatin (MEVACOR) 20 MG tablet Take 20 mg by mouth Every Night. 12/28/21   Radha Jarquin MD        Social History:   Social History     Tobacco Use   • Smoking status: Current Every Day Smoker     Packs/day: 1.00     Years: 45.00     Pack years: 45.00   • Smokeless tobacco: Never Used   Vaping Use   • Vaping Use: Never used   Substance Use Topics   • Alcohol use: Not Currently   • Drug use: Never     Recent travel: no     Review of Systems:  Review of Systems   I performed a 10 point review of systems which was all negative, except for the positives found in the HPI above.  Physical Exam:  /92   Pulse 75   Temp 98.8 °F (37.1 °C)   Resp 20   Ht 160 cm (63\")   Wt 92 kg (202 lb 13.2 oz)   SpO2 96%   BMI 35.93 kg/m²     Physical Exam     General: Awake alert and in no acute distress     HEENT: Head normocephalic, abrasion to right forehead, eyes PERRLA EOMI, nose normal, oropharynx normal.     Neck: Supple full range of motion, no meningismus, no lymphadenopathy     Heart: Regular rate and rhythm, no murmurs or rubs, 2+ radial pulses bilaterally     Lungs: Clear to auscultation bilaterally without wheezes or crackles, no respiratory distress     Abdomen: Soft, nontender, nondistended, no rebound or guarding     Back exam:  No L-spine tenderness.  No rash.     Skin: Warm, dry, no rash, multiple superficial abrasions and small lacerations to right hand, large superficial abrasion posterior right " forearm extending to olecranon process     Musculoskeletal: Decreased range of motion of right arm secondary to pain, no lower extremity edema, neurovascular status intact     Neurologic: Oriented x3, no motor deficits no sensory deficits     Psychiatric: Mood appears stable, no psychosis        Medications in the Emergency Department:  Medications   Tetanus-Diphth-Acell Pertussis (BOOSTRIX) injection 0.5 mL (0.5 mL Intramuscular Given 8/9/22 1736)   ibuprofen (ADVIL,MOTRIN) tablet 600 mg (600 mg Oral Given 8/9/22 1736)   bacitracin 500 UNIT/GM ointment 1 application (1 application Topical Given 8/9/22 1834)   bacitracin 500 UNIT/GM ointment 1 application (1 application Topical Given 8/9/22 1857)        Labs  Lab Results (last 24 hours)     ** No results found for the last 24 hours. **           Imaging:  XR Elbow 3+ View Right    Result Date: 8/9/2022  PROCEDURE: XR ELBOW 3+ VW RIGHT  COMPARISON: None  INDICATIONS: GENERALIZED RIGHT ELBOW PAIN AFTER FALL LAST NIGHT  FINDINGS:  There is no acute fracture or dislocation.  There are no displaced fat pads to indicate a joint effusion.  The joint spaces are well maintained.  There are radiopaque densities either within the soft tissues of the proximal right forearm versus of the patient's bandage.        1. No acute fracture or dislocation. 2. Radiopaque densities as described.      KRISTA KOHLER MD       Electronically Signed and Approved By: KRISTA KOHLER MD on 8/09/2022 at 17:27             XR Hand 2 View Right    Result Date: 8/9/2022  PROCEDURE: XR HAND 2 VW RIGHT  COMPARISON: None  INDICATIONS: GENERALIZED RIGHT HAND PAIN AFTER FALL LAST NIGHT  FINDINGS:  There is no acute fracture or dislocation.  There is narrowing and spurring of the interphalangeal joints of the digits and the scaphoid/multangular joint compatible with mild osteoarthritis.  There is a pulse oximeter obscuring the right index finger.  The soft tissues are unremarkable.       No acute fracture or  dislocation.      KRISTA KOHLER MD       Electronically Signed and Approved By: KRISTA KOHLER MD on 8/09/2022 at 17:26             CT Head Without Contrast    Result Date: 8/9/2022  PROCEDURE: CT HEAD WO CONTRAST  COMPARISON:  None INDICATIONS: fall, head injury  PROTOCOL:   Standard imaging protocol performed    RADIATION:   DLP: 943mGy*cm   MA and/or KV was adjusted to minimize radiation dose.     TECHNIQUE: After obtaining the patient's consent, CT images were obtained without non-ionic intravenous contrast material.  FINDINGS:  There is slight prominence of the sulci and widening of the fissures indicating mild volume loss from bilateral frontal cortical atrophy.  The ventricles and basal cisterns are well maintained and normal.  There is no acute hemorrhage, midline shift, or suspicious extra-axial fluid collections.  The orbital contents are normal.  There is mucosal thickening in the right maxillary sinus.  The remaining paranasal and mastoid sinuses are clear.  The calvarium is unremarkable.       No acute findings.     KRISTA KOHLER MD       Electronically Signed and Approved By: KRISTA KOHLER MD on 8/09/2022 at 18:10               Procedures:  Procedures    Progress  ED Course as of 08/09/22 2334   Tue Aug 09, 2022   1614 --- PROVIDER IN TRIAGE NOTE ---    The patient was evaluated my Jono grajeda in triage. Orders were placed and the patient is currently awaiting disposition. [AJ]   1820 CT Head Without Contrast [CW]   1820 No acute findings [CW]   1820 XR Hand 2 View Right  No acute findings [CW]   1820 XR Elbow 3+ View Right  Radiodense opacities proximal forearm [CW]      ED Course User Index  [AJ] Jono Peralta PA-C  [CW] Mojgan Taylor APRN                            The patient was initially evaluated in the triage area where orders were placed. The patient was later dispositioned by BRETT Moyer.      Medical Decision Making:  MDM  Number of Diagnoses or Management Options  Abrasions of  multiple sites  Contusion of right elbow, initial encounter  Fall, initial encounter  Injury of head, initial encounter  Need for Tdap vaccination  Right wrist pain  Diagnosis management comments: Seen and assessed as noted.  Vital signs stable, no acute distress, afebrile.      Wounds were cleaned with iodine and saline, antibiotic ointment and nonadherent dressing were applied.  The patient received a tetanus vaccine in the ED.        The patient presents with an acute closed head injury. Stuart Criteria for CT scan was followed. CT of the head is required for patients with minor head injury and any one of the following (including a GCS of 15):     1.                     Headache   2.                     Vomiting   3.                     Older than 60 years   4.                     Drug or Alcohol intoxication   5.                     Persistent anterograde amnesia   6.                     Visible trauma above the clavicle   7.                     Seizure.      The CT scan of the head shows no acute intracranial abnormalities. This includes subarachnoid hemorrhage, subdural hematoma, epidural hematoma, or calvarial fractures. The patient is neurologically intact, has a normal mental status and is ambulatory in the ED. The patient has had no seizure like activity in the ED. The vital signs have been stable. The patient's condition is stable and appropriate for discharge. The patient made aware of the symptoms of post-concussive syndrome. The patient was counseled to return to the ED for motor or sensory deficit, altered mental status, uncontrollable headache, visual changes, seizures, or for any re-examination of new symptoms. The patient will pursue further outpatient evaluation with the primary care physician or other designated or consulting position as indicated in the discharge instructions as a follow up.       Amount and/or Complexity of Data Reviewed  Tests in the radiology section of CPT®: reviewed  and ordered         Final diagnoses:   Fall, initial encounter   Injury of head, initial encounter   Abrasions of multiple sites   Right wrist pain   Contusion of right elbow, initial encounter   Need for Tdap vaccination        Disposition:  ED Disposition     ED Disposition   Discharge    Condition   Stable    Comment   --             This medical record created using voice recognition software.           Mojgan Taylor, BRETT  08/09/22 4620

## 2022-08-09 NOTE — DISCHARGE INSTRUCTIONS
There were no acute findings identified on your x-rays or head CT.    Keep your wounds clean and dry.  Wash with soap and water twice daily, then apply antibiotic ointment thinly.  Monitor for signs of infection such as pus drainage, increased swelling, worsening redness, streaking.    Apply ice to the areas that hurt several times per day.  Take Tylenol and/or ibuprofen as needed for pain.    Return for worsening symptoms such as blurry vision, severe headache, dizziness, confusion, lethargy or any new concerns.

## 2022-11-02 ENCOUNTER — HOSPITAL ENCOUNTER (OUTPATIENT)
Facility: HOSPITAL | Age: 67
Setting detail: OBSERVATION
Discharge: HOME | End: 2022-11-04
Attending: EMERGENCY MEDICINE | Admitting: INTERNAL MEDICINE

## 2022-11-02 ENCOUNTER — APPOINTMENT (OUTPATIENT)
Dept: GENERAL RADIOLOGY | Facility: HOSPITAL | Age: 67
End: 2022-11-02

## 2022-11-02 DIAGNOSIS — Z78.9 DECREASED ACTIVITIES OF DAILY LIVING (ADL): ICD-10-CM

## 2022-11-02 DIAGNOSIS — J44.1 COPD EXACERBATION: ICD-10-CM

## 2022-11-02 DIAGNOSIS — J96.01 ACUTE RESPIRATORY FAILURE WITH HYPOXIA: Primary | ICD-10-CM

## 2022-11-02 DIAGNOSIS — R26.2 DIFFICULTY WALKING: ICD-10-CM

## 2022-11-02 DIAGNOSIS — J20.9 ACUTE BRONCHITIS, UNSPECIFIED ORGANISM: ICD-10-CM

## 2022-11-02 LAB
ALBUMIN SERPL-MCNC: 3.8 G/DL (ref 3.5–5.2)
ALBUMIN/GLOB SERPL: 1.2 G/DL
ALP SERPL-CCNC: 112 U/L (ref 39–117)
ALT SERPL W P-5'-P-CCNC: 16 U/L (ref 1–33)
ANION GAP SERPL CALCULATED.3IONS-SCNC: 15.5 MMOL/L (ref 5–15)
AST SERPL-CCNC: 17 U/L (ref 1–32)
BASOPHILS # BLD AUTO: 0.06 10*3/MM3 (ref 0–0.2)
BASOPHILS NFR BLD AUTO: 0.4 % (ref 0–1.5)
BILIRUB SERPL-MCNC: 0.5 MG/DL (ref 0–1.2)
BUN SERPL-MCNC: 27 MG/DL (ref 8–23)
BUN/CREAT SERPL: 31 (ref 7–25)
CALCIUM SPEC-SCNC: 9.7 MG/DL (ref 8.6–10.5)
CHLORIDE SERPL-SCNC: 100 MMOL/L (ref 98–107)
CO2 SERPL-SCNC: 20.5 MMOL/L (ref 22–29)
CREAT SERPL-MCNC: 0.87 MG/DL (ref 0.57–1)
D-LACTATE SERPL-SCNC: 0.4 MMOL/L (ref 0.5–2)
DEPRECATED RDW RBC AUTO: 44.5 FL (ref 37–54)
EGFRCR SERPLBLD CKD-EPI 2021: 73.1 ML/MIN/1.73
EOSINOPHIL # BLD AUTO: 0.04 10*3/MM3 (ref 0–0.4)
EOSINOPHIL NFR BLD AUTO: 0.3 % (ref 0.3–6.2)
ERYTHROCYTE [DISTWIDTH] IN BLOOD BY AUTOMATED COUNT: 13.1 % (ref 12.3–15.4)
FLUAV AG NPH QL: NEGATIVE
FLUBV AG NPH QL IA: NEGATIVE
GLOBULIN UR ELPH-MCNC: 3.1 GM/DL
GLUCOSE SERPL-MCNC: 140 MG/DL (ref 65–99)
HCT VFR BLD AUTO: 53 % (ref 34–46.6)
HGB BLD-MCNC: 18.3 G/DL (ref 12–15.9)
HOLD SPECIMEN: NORMAL
HOLD SPECIMEN: NORMAL
IMM GRANULOCYTES # BLD AUTO: 0.16 10*3/MM3 (ref 0–0.05)
IMM GRANULOCYTES NFR BLD AUTO: 1 % (ref 0–0.5)
LYMPHOCYTES # BLD AUTO: 3.33 10*3/MM3 (ref 0.7–3.1)
LYMPHOCYTES NFR BLD AUTO: 20.9 % (ref 19.6–45.3)
MCH RBC QN AUTO: 32 PG (ref 26.6–33)
MCHC RBC AUTO-ENTMCNC: 34.5 G/DL (ref 31.5–35.7)
MCV RBC AUTO: 92.8 FL (ref 79–97)
MONOCYTES # BLD AUTO: 1.07 10*3/MM3 (ref 0.1–0.9)
MONOCYTES NFR BLD AUTO: 6.7 % (ref 5–12)
NEUTROPHILS NFR BLD AUTO: 11.24 10*3/MM3 (ref 1.7–7)
NEUTROPHILS NFR BLD AUTO: 70.7 % (ref 42.7–76)
NRBC BLD AUTO-RTO: 0 /100 WBC (ref 0–0.2)
NT-PROBNP SERPL-MCNC: 172.8 PG/ML (ref 0–900)
PLATELET # BLD AUTO: 321 10*3/MM3 (ref 140–450)
PMV BLD AUTO: 9.9 FL (ref 6–12)
POTASSIUM SERPL-SCNC: 4 MMOL/L (ref 3.5–5.2)
PROT SERPL-MCNC: 6.9 G/DL (ref 6–8.5)
RBC # BLD AUTO: 5.71 10*6/MM3 (ref 3.77–5.28)
SODIUM SERPL-SCNC: 136 MMOL/L (ref 136–145)
TROPONIN T SERPL-MCNC: 0.01 NG/ML (ref 0–0.03)
WBC NRBC COR # BLD: 15.9 10*3/MM3 (ref 3.4–10.8)
WHOLE BLOOD HOLD COAG: NORMAL
WHOLE BLOOD HOLD SPECIMEN: NORMAL

## 2022-11-02 PROCEDURE — 93005 ELECTROCARDIOGRAM TRACING: CPT | Performed by: EMERGENCY MEDICINE

## 2022-11-02 PROCEDURE — 93005 ELECTROCARDIOGRAM TRACING: CPT

## 2022-11-02 PROCEDURE — 96365 THER/PROPH/DIAG IV INF INIT: CPT

## 2022-11-02 PROCEDURE — C9803 HOPD COVID-19 SPEC COLLECT: HCPCS

## 2022-11-02 PROCEDURE — 83880 ASSAY OF NATRIURETIC PEPTIDE: CPT

## 2022-11-02 PROCEDURE — 87040 BLOOD CULTURE FOR BACTERIA: CPT | Performed by: EMERGENCY MEDICINE

## 2022-11-02 PROCEDURE — U0005 INFEC AGEN DETEC AMPLI PROBE: HCPCS | Performed by: EMERGENCY MEDICINE

## 2022-11-02 PROCEDURE — 93010 ELECTROCARDIOGRAM REPORT: CPT | Performed by: SPECIALIST

## 2022-11-02 PROCEDURE — 94640 AIRWAY INHALATION TREATMENT: CPT

## 2022-11-02 PROCEDURE — 94799 UNLISTED PULMONARY SVC/PX: CPT

## 2022-11-02 PROCEDURE — 99220 PR INITIAL OBSERVATION CARE/DAY 70 MINUTES: CPT | Performed by: HOSPITALIST

## 2022-11-02 PROCEDURE — 25010000002 PIPERACILLIN SOD-TAZOBACTAM PER 1 G: Performed by: EMERGENCY MEDICINE

## 2022-11-02 PROCEDURE — 99285 EMERGENCY DEPT VISIT HI MDM: CPT

## 2022-11-02 PROCEDURE — 71045 X-RAY EXAM CHEST 1 VIEW: CPT

## 2022-11-02 PROCEDURE — G0378 HOSPITAL OBSERVATION PER HR: HCPCS

## 2022-11-02 PROCEDURE — 80053 COMPREHEN METABOLIC PANEL: CPT

## 2022-11-02 PROCEDURE — 84484 ASSAY OF TROPONIN QUANT: CPT

## 2022-11-02 PROCEDURE — 96375 TX/PRO/DX INJ NEW DRUG ADDON: CPT

## 2022-11-02 PROCEDURE — 87804 INFLUENZA ASSAY W/OPTIC: CPT | Performed by: EMERGENCY MEDICINE

## 2022-11-02 PROCEDURE — 83605 ASSAY OF LACTIC ACID: CPT | Performed by: EMERGENCY MEDICINE

## 2022-11-02 PROCEDURE — 25010000002 METHYLPREDNISOLONE PER 125 MG: Performed by: EMERGENCY MEDICINE

## 2022-11-02 PROCEDURE — U0004 COV-19 TEST NON-CDC HGH THRU: HCPCS | Performed by: EMERGENCY MEDICINE

## 2022-11-02 PROCEDURE — 85025 COMPLETE CBC W/AUTO DIFF WBC: CPT

## 2022-11-02 RX ORDER — ENOXAPARIN SODIUM 100 MG/ML
40 INJECTION SUBCUTANEOUS DAILY
Status: DISCONTINUED | OUTPATIENT
Start: 2022-11-03 | End: 2022-11-04 | Stop reason: HOSPADM

## 2022-11-02 RX ORDER — SODIUM CHLORIDE 0.9 % (FLUSH) 0.9 %
10 SYRINGE (ML) INJECTION AS NEEDED
Status: DISCONTINUED | OUTPATIENT
Start: 2022-11-02 | End: 2022-11-04 | Stop reason: HOSPADM

## 2022-11-02 RX ORDER — ACETAMINOPHEN 325 MG/1
650 TABLET ORAL ONCE
Status: COMPLETED | OUTPATIENT
Start: 2022-11-02 | End: 2022-11-02

## 2022-11-02 RX ORDER — LIDOCAINE HYDROCHLORIDE 10 MG/ML
5 INJECTION, SOLUTION EPIDURAL; INFILTRATION; INTRACAUDAL; PERINEURAL ONCE
Status: COMPLETED | OUTPATIENT
Start: 2022-11-02 | End: 2022-11-02

## 2022-11-02 RX ORDER — HYDROCODONE BITARTRATE AND ACETAMINOPHEN 5; 325 MG/1; MG/1
1 TABLET ORAL EVERY 4 HOURS PRN
Status: DISCONTINUED | OUTPATIENT
Start: 2022-11-02 | End: 2022-11-04 | Stop reason: HOSPADM

## 2022-11-02 RX ORDER — METHYLPREDNISOLONE SODIUM SUCCINATE 40 MG/ML
40 INJECTION, POWDER, LYOPHILIZED, FOR SOLUTION INTRAMUSCULAR; INTRAVENOUS EVERY 12 HOURS
Status: DISCONTINUED | OUTPATIENT
Start: 2022-11-03 | End: 2022-11-04 | Stop reason: HOSPADM

## 2022-11-02 RX ORDER — METHYLPREDNISOLONE SODIUM SUCCINATE 125 MG/2ML
125 INJECTION, POWDER, LYOPHILIZED, FOR SOLUTION INTRAMUSCULAR; INTRAVENOUS ONCE
Status: COMPLETED | OUTPATIENT
Start: 2022-11-02 | End: 2022-11-02

## 2022-11-02 RX ORDER — ONDANSETRON 2 MG/ML
4 INJECTION INTRAMUSCULAR; INTRAVENOUS EVERY 6 HOURS PRN
Status: DISCONTINUED | OUTPATIENT
Start: 2022-11-02 | End: 2022-11-04 | Stop reason: HOSPADM

## 2022-11-02 RX ORDER — HYDROCODONE BITARTRATE AND ACETAMINOPHEN 7.5; 325 MG/1; MG/1
2 TABLET ORAL EVERY 4 HOURS PRN
Status: DISCONTINUED | OUTPATIENT
Start: 2022-11-02 | End: 2022-11-04 | Stop reason: HOSPADM

## 2022-11-02 RX ORDER — NALOXONE HCL 0.4 MG/ML
0.4 VIAL (ML) INJECTION
Status: DISCONTINUED | OUTPATIENT
Start: 2022-11-02 | End: 2022-11-04 | Stop reason: HOSPADM

## 2022-11-02 RX ORDER — ALLOPURINOL 100 MG/1
100 TABLET ORAL NIGHTLY
Status: DISCONTINUED | OUTPATIENT
Start: 2022-11-03 | End: 2022-11-04 | Stop reason: HOSPADM

## 2022-11-02 RX ORDER — FAMOTIDINE 20 MG/1
40 TABLET, FILM COATED ORAL DAILY
Status: DISCONTINUED | OUTPATIENT
Start: 2022-11-03 | End: 2022-11-04 | Stop reason: HOSPADM

## 2022-11-02 RX ORDER — CHOLECALCIFEROL (VITAMIN D3) 125 MCG
5 CAPSULE ORAL NIGHTLY PRN
Status: DISCONTINUED | OUTPATIENT
Start: 2022-11-02 | End: 2022-11-04 | Stop reason: HOSPADM

## 2022-11-02 RX ORDER — BISACODYL 5 MG/1
5 TABLET, DELAYED RELEASE ORAL DAILY PRN
Status: DISCONTINUED | OUTPATIENT
Start: 2022-11-02 | End: 2022-11-04 | Stop reason: HOSPADM

## 2022-11-02 RX ORDER — ARFORMOTEROL TARTRATE 15 UG/2ML
15 SOLUTION RESPIRATORY (INHALATION)
Status: DISCONTINUED | OUTPATIENT
Start: 2022-11-02 | End: 2022-11-04 | Stop reason: HOSPADM

## 2022-11-02 RX ORDER — AMOXICILLIN 250 MG
2 CAPSULE ORAL 2 TIMES DAILY
Status: DISCONTINUED | OUTPATIENT
Start: 2022-11-02 | End: 2022-11-04 | Stop reason: HOSPADM

## 2022-11-02 RX ORDER — ACETAMINOPHEN 325 MG/1
650 TABLET ORAL EVERY 4 HOURS PRN
Status: DISCONTINUED | OUTPATIENT
Start: 2022-11-02 | End: 2022-11-04 | Stop reason: HOSPADM

## 2022-11-02 RX ORDER — SODIUM CHLORIDE 9 MG/ML
40 INJECTION, SOLUTION INTRAVENOUS AS NEEDED
Status: DISCONTINUED | OUTPATIENT
Start: 2022-11-02 | End: 2022-11-04 | Stop reason: HOSPADM

## 2022-11-02 RX ORDER — MORPHINE SULFATE 2 MG/ML
1 INJECTION, SOLUTION INTRAMUSCULAR; INTRAVENOUS EVERY 4 HOURS PRN
Status: DISCONTINUED | OUTPATIENT
Start: 2022-11-02 | End: 2022-11-04 | Stop reason: HOSPADM

## 2022-11-02 RX ORDER — IBUPROFEN 400 MG/1
800 TABLET ORAL EVERY 8 HOURS PRN
Status: DISCONTINUED | OUTPATIENT
Start: 2022-11-02 | End: 2022-11-04 | Stop reason: HOSPADM

## 2022-11-02 RX ORDER — SODIUM CHLORIDE 9 MG/ML
125 INJECTION, SOLUTION INTRAVENOUS CONTINUOUS
Status: DISCONTINUED | OUTPATIENT
Start: 2022-11-02 | End: 2022-11-04

## 2022-11-02 RX ORDER — IPRATROPIUM BROMIDE AND ALBUTEROL SULFATE 2.5; .5 MG/3ML; MG/3ML
3 SOLUTION RESPIRATORY (INHALATION)
Status: DISCONTINUED | OUTPATIENT
Start: 2022-11-02 | End: 2022-11-04 | Stop reason: HOSPADM

## 2022-11-02 RX ORDER — NITROGLYCERIN 0.4 MG/1
0.4 TABLET SUBLINGUAL
Status: DISCONTINUED | OUTPATIENT
Start: 2022-11-02 | End: 2022-11-04 | Stop reason: HOSPADM

## 2022-11-02 RX ORDER — GUAIFENESIN 600 MG/1
1200 TABLET, EXTENDED RELEASE ORAL EVERY 12 HOURS SCHEDULED
Status: DISCONTINUED | OUTPATIENT
Start: 2022-11-03 | End: 2022-11-04 | Stop reason: HOSPADM

## 2022-11-02 RX ORDER — BISACODYL 10 MG
10 SUPPOSITORY, RECTAL RECTAL DAILY PRN
Status: DISCONTINUED | OUTPATIENT
Start: 2022-11-02 | End: 2022-11-04 | Stop reason: HOSPADM

## 2022-11-02 RX ORDER — ATORVASTATIN CALCIUM 10 MG/1
10 TABLET, FILM COATED ORAL DAILY
Status: DISCONTINUED | OUTPATIENT
Start: 2022-11-03 | End: 2022-11-04 | Stop reason: HOSPADM

## 2022-11-02 RX ORDER — FERROUS SULFATE 325(65) MG
325 TABLET ORAL
Status: DISCONTINUED | OUTPATIENT
Start: 2022-11-03 | End: 2022-11-04 | Stop reason: HOSPADM

## 2022-11-02 RX ORDER — ONDANSETRON 4 MG/1
4 TABLET, FILM COATED ORAL EVERY 6 HOURS PRN
Status: DISCONTINUED | OUTPATIENT
Start: 2022-11-02 | End: 2022-11-04 | Stop reason: HOSPADM

## 2022-11-02 RX ORDER — ACETAMINOPHEN 650 MG/1
650 SUPPOSITORY RECTAL EVERY 4 HOURS PRN
Status: DISCONTINUED | OUTPATIENT
Start: 2022-11-02 | End: 2022-11-04 | Stop reason: HOSPADM

## 2022-11-02 RX ORDER — POLYETHYLENE GLYCOL 3350 17 G/17G
17 POWDER, FOR SOLUTION ORAL DAILY PRN
Status: DISCONTINUED | OUTPATIENT
Start: 2022-11-02 | End: 2022-11-04 | Stop reason: HOSPADM

## 2022-11-02 RX ORDER — IPRATROPIUM BROMIDE AND ALBUTEROL SULFATE 2.5; .5 MG/3ML; MG/3ML
3 SOLUTION RESPIRATORY (INHALATION)
Status: COMPLETED | OUTPATIENT
Start: 2022-11-02 | End: 2022-11-02

## 2022-11-02 RX ORDER — SODIUM CHLORIDE 0.9 % (FLUSH) 0.9 %
10 SYRINGE (ML) INJECTION EVERY 12 HOURS SCHEDULED
Status: DISCONTINUED | OUTPATIENT
Start: 2022-11-02 | End: 2022-11-04 | Stop reason: HOSPADM

## 2022-11-02 RX ORDER — BUDESONIDE 0.5 MG/2ML
0.5 INHALANT ORAL
Status: DISCONTINUED | OUTPATIENT
Start: 2022-11-02 | End: 2022-11-04 | Stop reason: HOSPADM

## 2022-11-02 RX ORDER — ACETAMINOPHEN 160 MG/5ML
650 SOLUTION ORAL EVERY 4 HOURS PRN
Status: DISCONTINUED | OUTPATIENT
Start: 2022-11-02 | End: 2022-11-04 | Stop reason: HOSPADM

## 2022-11-02 RX ADMIN — TAZOBACTAM SODIUM AND PIPERACILLIN SODIUM 3.38 G: 375; 3 INJECTION, SOLUTION INTRAVENOUS at 22:10

## 2022-11-02 RX ADMIN — IPRATROPIUM BROMIDE AND ALBUTEROL SULFATE 3 ML: 2.5; .5 SOLUTION RESPIRATORY (INHALATION) at 20:44

## 2022-11-02 RX ADMIN — LIDOCAINE HYDROCHLORIDE 5 ML: 10 INJECTION, SOLUTION EPIDURAL; INFILTRATION; INTRACAUDAL; PERINEURAL at 20:52

## 2022-11-02 RX ADMIN — IPRATROPIUM BROMIDE AND ALBUTEROL SULFATE 3 ML: 2.5; .5 SOLUTION RESPIRATORY (INHALATION) at 20:40

## 2022-11-02 RX ADMIN — IPRATROPIUM BROMIDE AND ALBUTEROL SULFATE 3 ML: 2.5; .5 SOLUTION RESPIRATORY (INHALATION) at 20:45

## 2022-11-02 RX ADMIN — ACETAMINOPHEN 650 MG: 325 TABLET ORAL at 22:01

## 2022-11-02 RX ADMIN — METHYLPREDNISOLONE SODIUM SUCCINATE 125 MG: 125 INJECTION, POWDER, FOR SOLUTION INTRAMUSCULAR; INTRAVENOUS at 20:44

## 2022-11-02 RX ADMIN — Medication 10 ML: at 22:22

## 2022-11-02 NOTE — ED NOTES
Pt states she does not usually wear O2 at home. Pt O2 92-93% on 2L NC. Pt c/o headache at this time. Took Excedrin at noon with little relief.

## 2022-11-02 NOTE — ED NOTES
Still awaiting provider evaluation, pt still complains of headache and asking for pain meds. Cool compresses have been given , lights dimmed and other interventions have been done for comfort without much relief.

## 2022-11-02 NOTE — ED PROVIDER NOTES
Time: 7:51 PM EDT  Arrived by: ambulance  Chief Complaint: SOB, headache  History provided by: pt  History is limited by: N/A     History of Present Illness:  Patient is a 67 y.o. year old female that presents to the emergency department with SOB and headache.    Pt has been SOB for a week. Pt also has a headache. Pt went to the doctor last Tuesday (10/25/22). Pt was prescribed a Z-pack and steroids. Pt says she is starting to feel worse. Pt is SOB on exertion and at rest. Pt has a nonproductive cough. She reports subjective fever. Pt admits emesis, sore throat, diarrhea, and nasal congestion. Pt has post-tussive emesis. She states her vomit is black. She reports bowel incontinence and loose stools with coughing.    Pt does not wear oxygen at home. Pt does not have COPD or asthma. Pt was a smoker but quit last Friday(10/28/22). She denies any swelling in her legs. Pt does not have a history of DVT or PE. She reports no prior MI or CHF. She reports no exposure to sickness. Pt has a nebulizer at home.         History provided by:  Patient   used: No        Similar Symptoms Previously: No  Recently seen: No      Patient Care Team  Primary Care Provider: Ava Darby APRN    Past Medical History:     No Known Allergies  Past Medical History:   Diagnosis Date   • Elevated hemoglobin (HCC)     HX OF    • Gout    • Hyperlipidemia    • Hypertension    • Low iron     HX OF, CURRENTLY TAKES IRON TABS   • PMB (postmenopausal bleeding)    • Thickened endometrium      Past Surgical History:   Procedure Laterality Date   •  SECTION     • D & C HYSTEROSCOPY MYOSURE N/A 2/3/2022    Procedure: HYSTEROSCOPY, DILATION AND CURETTAGE;  Surgeon: Kimmy Jaffe MD;  Location: [facility] MAIN OR;  Service: Gynecology;  Laterality: N/A;   • D & C WITH SUCTION     • FINGER SURGERY Bilateral    • WISDOM TOOTH EXTRACTION       History reviewed. No pertinent family history.    Home Medications:  Prior to  Admission medications    Medication Sig Start Date End Date Taking? Authorizing Provider   allopurinol (ZYLOPRIM) 100 MG tablet Take 100 mg by mouth Every Night. 12/28/21   Radha Jarquin MD   bacitracin 500 UNIT/GM ointment Apply 1 application topically to the appropriate area as directed 2 (Two) Times a Day. 8/9/22   Mojgan Taylor APRN   Cholecalciferol (Vitamin D3) 50 MCG (2000 UT) tablet Take 1 tablet by mouth Daily.    Radha Jarquin MD   ferrous sulfate 140 (45 Fe) MG tablet controlled-release tablet Take 1 tablet by mouth 2 (Two) Times a Week.    Radha Jarquin MD   ibuprofen (ADVIL,MOTRIN) 800 MG tablet Take 800 mg by mouth Every 8 (Eight) Hours As Needed for Mild Pain .    Radha Jarquin MD   lisinopril-hydrochlorothiazide (PRINZIDE,ZESTORETIC) 10-12.5 MG per tablet Take 1 tablet by mouth Every Night. 12/28/21   Radha Jarquin MD   lovastatin (MEVACOR) 20 MG tablet Take 20 mg by mouth Every Night. 12/28/21   Radha Jarquin MD        Social History:   Social History     Tobacco Use   • Smoking status: Every Day     Packs/day: 1.00     Years: 45.00     Pack years: 45.00     Types: Cigarettes   • Smokeless tobacco: Never   Vaping Use   • Vaping Use: Never used   Substance Use Topics   • Alcohol use: Not Currently   • Drug use: Never         Review of Systems:  Review of Systems   Constitutional: Positive for fever. Negative for chills and diaphoresis.   HENT: Positive for sore throat. Negative for congestion, postnasal drip and rhinorrhea.    Eyes: Negative for photophobia.   Respiratory: Positive for cough and shortness of breath. Negative for chest tightness.    Cardiovascular: Negative for chest pain, palpitations and leg swelling.   Gastrointestinal: Positive for diarrhea and vomiting. Negative for abdominal pain and nausea.   Genitourinary: Negative for difficulty urinating, dysuria, flank pain, frequency, hematuria and urgency.   Musculoskeletal: Negative for  "neck pain and neck stiffness.   Skin: Negative for pallor and rash.   Neurological: Positive for headaches. Negative for dizziness, syncope, weakness and numbness.   Hematological: Negative for adenopathy. Does not bruise/bleed easily.   Psychiatric/Behavioral: Negative.         Physical Exam:  /75   Pulse 64   Temp 97.8 °F (36.6 °C) (Oral)   Resp 17   Ht 167.6 cm (66\")   Wt 88.2 kg (194 lb 7.1 oz)   SpO2 92%   BMI 31.38 kg/m²     Physical Exam  Vitals and nursing note reviewed.   Constitutional:       General: She is not in acute distress.     Appearance: Normal appearance. She is not ill-appearing, toxic-appearing or diaphoretic.   HENT:      Head: Normocephalic and atraumatic.      Mouth/Throat:      Mouth: Mucous membranes are moist.   Eyes:      Pupils: Pupils are equal, round, and reactive to light.   Cardiovascular:      Rate and Rhythm: Normal rate and regular rhythm.      Pulses: Normal pulses.           Carotid pulses are 2+ on the right side and 2+ on the left side.       Radial pulses are 2+ on the right side and 2+ on the left side.        Femoral pulses are 2+ on the right side and 2+ on the left side.       Popliteal pulses are 2+ on the right side and 2+ on the left side.        Dorsalis pedis pulses are 2+ on the right side and 2+ on the left side.        Posterior tibial pulses are 2+ on the right side and 2+ on the left side.      Heart sounds: Normal heart sounds. No murmur heard.  Pulmonary:      Effort: Pulmonary effort is normal. No accessory muscle usage, respiratory distress or retractions.      Breath sounds: Decreased breath sounds and wheezing present. No rhonchi or rales.      Comments: Diffuse wheezing  Abdominal:      General: Abdomen is flat. There is no distension.      Palpations: Abdomen is soft. There is no mass.      Tenderness: There is no abdominal tenderness. There is no right CVA tenderness, left CVA tenderness, guarding or rebound.      Comments: No rigidity "   Musculoskeletal:         General: No swelling, tenderness or deformity.      Cervical back: Neck supple. No tenderness.      Right lower leg: No tenderness. No edema.      Left lower leg: No tenderness. No edema.   Skin:     General: Skin is warm and dry.      Capillary Refill: Capillary refill takes less than 2 seconds.      Coloration: Skin is not jaundiced or pale.      Findings: No erythema.   Neurological:      General: No focal deficit present.      Mental Status: She is alert and oriented to person, place, and time. Mental status is at baseline.      Sensory: No sensory deficit.      Motor: No weakness.   Psychiatric:         Mood and Affect: Mood normal.         Behavior: Behavior normal.                Medications in the Emergency Department:  Medications   sodium chloride 0.9 % flush 10 mL (has no administration in time range)   piperacillin-tazobactam (ZOSYN) 3.375 g in iso-osmotic dextrose 50 ml (premix) (3.375 g Intravenous New Bag 11/2/22 2210)   ipratropium-albuterol (DUO-NEB) nebulizer solution 3 mL (3 mL Nebulization Given 11/2/22 2045)   methylPREDNISolone sodium succinate (SOLU-Medrol) injection 125 mg (125 mg Intravenous Given 11/2/22 2044)   lidocaine PF 1% (XYLOCAINE) injection 5 mL (5 mL Nebulization Given 11/2/22 2052)   acetaminophen (TYLENOL) tablet 650 mg (650 mg Oral Given 11/2/22 2201)        Labs  Lab Results (last 24 hours)     Procedure Component Value Units Date/Time    CBC & Differential [[id number]]  (Abnormal) Collected: 11/02/22 1634    Specimen: Blood Updated: 11/02/22 1647    Narrative:      The following orders were created for panel order CBC & Differential.  Procedure                               Abnormality         Status                     ---------                               -----------         ------                     CBC Auto Differential[[id number]]        Abnormal            Final result                 Please view results for these tests on the individual  orders.    Comprehensive Metabolic Panel [[id number]]  (Abnormal) Collected: 11/02/22 1634    Specimen: Blood Updated: 11/02/22 1722     Glucose 140 mg/dL      BUN 27 mg/dL      Creatinine 0.87 mg/dL      Sodium 136 mmol/L      Potassium 4.0 mmol/L      Comment: Slight hemolysis detected by analyzer. Results may be affected.        Chloride 100 mmol/L      CO2 20.5 mmol/L      Calcium 9.7 mg/dL      Total Protein 6.9 g/dL      Albumin 3.80 g/dL      ALT (SGPT) 16 U/L      AST (SGOT) 17 U/L      Comment: Slight hemolysis detected by analyzer. Results may be affected.        Alkaline Phosphatase 112 U/L      Total Bilirubin 0.5 mg/dL      Globulin 3.1 gm/dL      A/G Ratio 1.2 g/dL      BUN/Creatinine Ratio 31.0     Anion Gap 15.5 mmol/L      eGFR 73.1 mL/min/1.73      Comment: National Kidney Foundation and American Society of Nephrology (ASN) Task Force recommended calculation based on the Chronic Kidney Disease Epidemiology Collaboration (CKD-EPI) equation refit without adjustment for race.       Narrative:      GFR Normal >60  Chronic Kidney Disease <60  Kidney Failure <15      BNP [[id number]]  (Normal) Collected: 11/02/22 1634    Specimen: Blood Updated: 11/02/22 1708     proBNP 172.8 pg/mL     Narrative:      Among patients with dyspnea, NT-proBNP is highly sensitive for the detection of acute congestive heart failure. In addition NT-proBNP of <300 pg/ml effectively rules out acute congestive heart failure with 99% negative predictive value.      Troponin [[id number]]  (Normal) Collected: 11/02/22 1634    Specimen: Blood Updated: 11/02/22 1710     Troponin T 0.015 ng/mL     Narrative:      Troponin T Reference Range:  <= 0.03 ng/mL-   Negative for AMI  >0.03 ng/mL-     Abnormal for myocardial necrosis.  Clinicians would have to utilize clinical acumen, EKG, Troponin and serial changes to determine if it is an Acute Myocardial Infarction or myocardial injury due to an underlying chronic condition.       Results may  be falsely decreased if patient taking Biotin.      CBC Auto Differential [[id number]]  (Abnormal) Collected: 11/02/22 1634    Specimen: Blood Updated: 11/02/22 1647     WBC 15.90 10*3/mm3      RBC 5.71 10*6/mm3      Hemoglobin 18.3 g/dL      Hematocrit 53.0 %      MCV 92.8 fL      MCH 32.0 pg      MCHC 34.5 g/dL      RDW 13.1 %      RDW-SD 44.5 fl      MPV 9.9 fL      Platelets 321 10*3/mm3      Neutrophil % 70.7 %      Lymphocyte % 20.9 %      Monocyte % 6.7 %      Eosinophil % 0.3 %      Basophil % 0.4 %      Immature Grans % 1.0 %      Neutrophils, Absolute 11.24 10*3/mm3      Lymphocytes, Absolute 3.33 10*3/mm3      Monocytes, Absolute 1.07 10*3/mm3      Eosinophils, Absolute 0.04 10*3/mm3      Basophils, Absolute 0.06 10*3/mm3      Immature Grans, Absolute 0.16 10*3/mm3      nRBC 0.0 /100 WBC     COVID-19,APTIMA PANTHER([facility]),[facility]/ [facility], NP/OP SWAB IN UTM/VTM/SALINE TRANSPORT MEDIA,24 HR TAT - Swab, Nasal Cavity [[id number]] Collected: 11/02/22 2044    Specimen: Swab from Nasal Cavity Updated: 11/02/22 2048    Influenza Antigen, Rapid - Swab, Nasopharynx [[id number]]  (Normal) Collected: 11/02/22 2044    Specimen: Swab from Nasopharynx Updated: 11/02/22 2112     Influenza A Ag, EIA Negative     Influenza B Ag, EIA Negative    Blood Culture - Blood, Arm, Left [[id number]] Collected: 11/02/22 2044    Specimen: Blood from Arm, Left Updated: 11/02/22 2051    Blood Culture - Blood, Arm, Left [[id number]] Collected: 11/02/22 2044    Specimen: Blood from Arm, Left Updated: 11/02/22 2051    Lactic Acid, Plasma [[id number]]  (Abnormal) Collected: 11/02/22 2044    Specimen: Blood from Arm, Left Updated: 11/02/22 2117     Lactate 0.4 mmol/L            Imaging:  XR Chest 1 View    Result Date: 11/2/2022  PROCEDURE: XR CHEST 1 VW  COMPARISON: [facility], CR, CHEST AP/PA 1 VIEW, 6/01/2016, 21:29.  INDICATIONS: short of breath, cough, vomiting, diarrhea  FINDINGS:  Mild the may reflect atelectasis though pneumonia  or aspiration is not excluded.  Cardiac and mediastinal contours are within normal limits.  Regional skeleton is unremarkable.  Pleural spaces are unremarkable.        1. Mild bibasilar opacities.  Differential includes atelectasis, pneumonia or aspiration.       JEANIE YEN MD       Electronically Signed and Approved By: JEANIE YEN MD on 11/02/2022 at 16:49               Procedures:  Procedures    Progress                            Medical Decision Making:  MDM  Number of Diagnoses or Management Options  Acute bronchitis, unspecified organism  Acute respiratory failure with hypoxia (HCC)  COPD exacerbation (HCC)  Diagnosis management comments: 8:10 PM EST: Pt's room air pulse ox is 89% at it's lowest.     Sepsis was not present in the emergency department or on arrival. This is supported as the patient does not either meet two out of the four SIRS criteria or has an obvious bacterial infection.      SIRS criteria considered:   1.                     Temperature > 100.4 or <98.6    2.                     Heart Rate > 90    3.                     Respiratory Rate > 22    4.                     WBC > 12K or <4K.     The patient was afebrile.  The patient's white count was elevated at 15.9 thousand.  Segs were elevated 11.24 thousand.  Patient's hemoglobin was 18.3.  The patient is a smoker.  The patient's white blood cell count certainly could be elevated due to the recent steroids patient's chemistry was rather unremarkable.  The patient's troponin and BNP were normal.  The patient's flu was negative.  The patient's COVID is pending at the time of admission.  The patient had a normal lactate.  Patient's chest x-ray demonstrated bilateral atelectasis versus infiltrate.  The chest x-ray probably was most consistent with atelectasis.  The patient had blood cultures obtained those are pending at the time of admission.  The patient was given Solu-Medrol, nebulized lidocaine and 3 breathing treatments.  The patient  was reassessed.  The patient's breath sounds were improved.  The patient subjectively did state that she felt better.  However the patient still had wheezing.  The patient was in no respiratory distress including tachypnea, accessory muscle use or intercostal retractions.  The patient's room air pulse ox would drop down to 88% and periodically the patient's pulse ox would drop down to 88% on 2 L.  The patient did have a good Plath waveform.  The patient was subsequently admitted to the hospital for acute respiratory failure with hypoxia, acute bronchitis, COPD exacerbation with possible pneumonia although thought less likely.  The patient was given Zosyn for possible pneumonia.  The patient had failed outpatient therapy of Zithromax and oral steroids       Amount and/or Complexity of Data Reviewed  Clinical lab tests: reviewed  Tests in the radiology section of CPT®: reviewed  Tests in the medicine section of CPT®: reviewed                 Final diagnoses:   Acute respiratory failure with hypoxia (HCC)   Acute bronchitis, unspecified organism   COPD exacerbation (HCC)        Disposition:  ED Disposition     ED Disposition   Decision to Admit    Condition   --    Comment   --             Documentation assistance provided by Uriah Sky acting as scribe for Joe Handley DO. Information recorded by the scribe was done at my direction and has been verified and validated by me.        Uriah Sky  11/02/22 2015       Uriah Sky  11/02/22 2016       Joe Handley DO  11/03/22 1183

## 2022-11-03 LAB
ANION GAP SERPL CALCULATED.3IONS-SCNC: 9.6 MMOL/L (ref 5–15)
BASOPHILS # BLD AUTO: 0.02 10*3/MM3 (ref 0–0.2)
BASOPHILS NFR BLD AUTO: 0.1 % (ref 0–1.5)
BUN SERPL-MCNC: 29 MG/DL (ref 8–23)
BUN/CREAT SERPL: 29.9 (ref 7–25)
CALCIUM SPEC-SCNC: 9.6 MG/DL (ref 8.6–10.5)
CHLORIDE SERPL-SCNC: 104 MMOL/L (ref 98–107)
CO2 SERPL-SCNC: 21.4 MMOL/L (ref 22–29)
CREAT SERPL-MCNC: 0.97 MG/DL (ref 0.57–1)
DEPRECATED RDW RBC AUTO: 44.7 FL (ref 37–54)
EGFRCR SERPLBLD CKD-EPI 2021: 64.2 ML/MIN/1.73
EOSINOPHIL # BLD AUTO: 0 10*3/MM3 (ref 0–0.4)
EOSINOPHIL NFR BLD AUTO: 0 % (ref 0.3–6.2)
ERYTHROCYTE [DISTWIDTH] IN BLOOD BY AUTOMATED COUNT: 13.1 % (ref 12.3–15.4)
GLUCOSE SERPL-MCNC: 147 MG/DL (ref 65–99)
HCT VFR BLD AUTO: 47.8 % (ref 34–46.6)
HGB BLD-MCNC: 16.5 G/DL (ref 12–15.9)
IMM GRANULOCYTES # BLD AUTO: 0.16 10*3/MM3 (ref 0–0.05)
IMM GRANULOCYTES NFR BLD AUTO: 1.1 % (ref 0–0.5)
INR PPP: 1 (ref 0.86–1.15)
INR PPP: 1.01 (ref 0.86–1.15)
LYMPHOCYTES # BLD AUTO: 1.23 10*3/MM3 (ref 0.7–3.1)
LYMPHOCYTES NFR BLD AUTO: 8.7 % (ref 19.6–45.3)
MAGNESIUM SERPL-MCNC: 2.2 MG/DL (ref 1.6–2.4)
MAGNESIUM SERPL-MCNC: 2.3 MG/DL (ref 1.6–2.4)
MCH RBC QN AUTO: 32.4 PG (ref 26.6–33)
MCHC RBC AUTO-ENTMCNC: 34.5 G/DL (ref 31.5–35.7)
MCV RBC AUTO: 93.7 FL (ref 79–97)
MONOCYTES # BLD AUTO: 0.19 10*3/MM3 (ref 0.1–0.9)
MONOCYTES NFR BLD AUTO: 1.3 % (ref 5–12)
NEUTROPHILS NFR BLD AUTO: 12.54 10*3/MM3 (ref 1.7–7)
NEUTROPHILS NFR BLD AUTO: 88.8 % (ref 42.7–76)
NRBC BLD AUTO-RTO: 0 /100 WBC (ref 0–0.2)
PHOSPHATE SERPL-MCNC: 2.4 MG/DL (ref 2.5–4.5)
PHOSPHATE SERPL-MCNC: 2.6 MG/DL (ref 2.5–4.5)
PLATELET # BLD AUTO: 270 10*3/MM3 (ref 140–450)
PMV BLD AUTO: 10.3 FL (ref 6–12)
POTASSIUM SERPL-SCNC: 4.1 MMOL/L (ref 3.5–5.2)
PROTHROMBIN TIME: 13.3 SECONDS (ref 11.8–14.9)
PROTHROMBIN TIME: 13.4 SECONDS (ref 11.8–14.9)
RBC # BLD AUTO: 5.1 10*6/MM3 (ref 3.77–5.28)
SARS-COV-2 RNA PNL SPEC NAA+PROBE: NOT DETECTED
SODIUM SERPL-SCNC: 135 MMOL/L (ref 136–145)
WBC NRBC COR # BLD: 14.14 10*3/MM3 (ref 3.4–10.8)

## 2022-11-03 PROCEDURE — 85610 PROTHROMBIN TIME: CPT | Performed by: HOSPITALIST

## 2022-11-03 PROCEDURE — 84100 ASSAY OF PHOSPHORUS: CPT | Performed by: HOSPITALIST

## 2022-11-03 PROCEDURE — 96376 TX/PRO/DX INJ SAME DRUG ADON: CPT

## 2022-11-03 PROCEDURE — 94760 N-INVAS EAR/PLS OXIMETRY 1: CPT

## 2022-11-03 PROCEDURE — 94664 DEMO&/EVAL PT USE INHALER: CPT

## 2022-11-03 PROCEDURE — 36415 COLL VENOUS BLD VENIPUNCTURE: CPT | Performed by: HOSPITALIST

## 2022-11-03 PROCEDURE — 97165 OT EVAL LOW COMPLEX 30 MIN: CPT

## 2022-11-03 PROCEDURE — 97161 PT EVAL LOW COMPLEX 20 MIN: CPT

## 2022-11-03 PROCEDURE — 85025 COMPLETE CBC W/AUTO DIFF WBC: CPT | Performed by: HOSPITALIST

## 2022-11-03 PROCEDURE — 25010000002 AZITHROMYCIN PER 500 MG: Performed by: HOSPITALIST

## 2022-11-03 PROCEDURE — 80048 BASIC METABOLIC PNL TOTAL CA: CPT | Performed by: HOSPITALIST

## 2022-11-03 PROCEDURE — 25010000002 AMPICILLIN-SULBACTAM PER 1.5 G: Performed by: INTERNAL MEDICINE

## 2022-11-03 PROCEDURE — 25010000002 ENOXAPARIN PER 10 MG: Performed by: HOSPITALIST

## 2022-11-03 PROCEDURE — 94799 UNLISTED PULMONARY SVC/PX: CPT

## 2022-11-03 PROCEDURE — 96372 THER/PROPH/DIAG INJ SC/IM: CPT

## 2022-11-03 PROCEDURE — 99226 PR SBSQ OBSERVATION CARE/DAY 35 MINUTES: CPT | Performed by: INTERNAL MEDICINE

## 2022-11-03 PROCEDURE — G0378 HOSPITAL OBSERVATION PER HR: HCPCS

## 2022-11-03 PROCEDURE — 25010000002 PIPERACILLIN SOD-TAZOBACTAM PER 1 G: Performed by: HOSPITALIST

## 2022-11-03 PROCEDURE — 96361 HYDRATE IV INFUSION ADD-ON: CPT

## 2022-11-03 PROCEDURE — 83735 ASSAY OF MAGNESIUM: CPT | Performed by: HOSPITALIST

## 2022-11-03 PROCEDURE — 25010000002 METHYLPREDNISOLONE PER 40 MG: Performed by: HOSPITALIST

## 2022-11-03 RX ORDER — AZITHROMYCIN 250 MG/1
500 TABLET, FILM COATED ORAL EVERY 24 HOURS
Status: DISCONTINUED | OUTPATIENT
Start: 2022-11-04 | End: 2022-11-04 | Stop reason: HOSPADM

## 2022-11-03 RX ADMIN — SODIUM CHLORIDE 125 ML/HR: 9 INJECTION, SOLUTION INTRAVENOUS at 00:00

## 2022-11-03 RX ADMIN — METHYLPREDNISOLONE SODIUM SUCCINATE 40 MG: 40 INJECTION, POWDER, FOR SOLUTION INTRAMUSCULAR; INTRAVENOUS at 09:38

## 2022-11-03 RX ADMIN — IPRATROPIUM BROMIDE AND ALBUTEROL SULFATE 3 ML: 2.5; .5 SOLUTION RESPIRATORY (INHALATION) at 06:30

## 2022-11-03 RX ADMIN — BUDESONIDE 0.5 MG: 0.5 INHALANT ORAL at 20:02

## 2022-11-03 RX ADMIN — Medication 10 ML: at 21:18

## 2022-11-03 RX ADMIN — IPRATROPIUM BROMIDE AND ALBUTEROL SULFATE 3 ML: 2.5; .5 SOLUTION RESPIRATORY (INHALATION) at 11:43

## 2022-11-03 RX ADMIN — FERROUS SULFATE TAB 325 MG (65 MG ELEMENTAL FE) 325 MG: 325 (65 FE) TAB at 10:01

## 2022-11-03 RX ADMIN — HYDROCODONE BITARTRATE AND ACETAMINOPHEN 1 TABLET: 5; 325 TABLET ORAL at 00:00

## 2022-11-03 RX ADMIN — AMPICILLIN SODIUM AND SULBACTAM SODIUM 3 G: 2; 1 INJECTION, POWDER, FOR SOLUTION INTRAMUSCULAR; INTRAVENOUS at 17:12

## 2022-11-03 RX ADMIN — SODIUM CHLORIDE 125 ML/HR: 9 INJECTION, SOLUTION INTRAVENOUS at 10:15

## 2022-11-03 RX ADMIN — HYDROCODONE POLISTIREX AND CHLORPHENIRAMINE POLISTIREX 5 ML: 10; 8 SUSPENSION, EXTENDED RELEASE ORAL at 00:00

## 2022-11-03 RX ADMIN — ACETAMINOPHEN 650 MG: 325 TABLET ORAL at 23:41

## 2022-11-03 RX ADMIN — ACETAMINOPHEN 650 MG: 325 TABLET ORAL at 06:15

## 2022-11-03 RX ADMIN — FAMOTIDINE 40 MG: 20 TABLET ORAL at 10:01

## 2022-11-03 RX ADMIN — IPRATROPIUM BROMIDE AND ALBUTEROL SULFATE 3 ML: 2.5; .5 SOLUTION RESPIRATORY (INHALATION) at 20:02

## 2022-11-03 RX ADMIN — TAZOBACTAM SODIUM AND PIPERACILLIN SODIUM 3.38 G: 375; 3 INJECTION, SOLUTION INTRAVENOUS at 06:10

## 2022-11-03 RX ADMIN — GUAIFENESIN 1200 MG: 600 TABLET ORAL at 10:00

## 2022-11-03 RX ADMIN — AZITHROMYCIN 500 MG: 500 INJECTION, POWDER, LYOPHILIZED, FOR SOLUTION INTRAVENOUS at 01:26

## 2022-11-03 RX ADMIN — LISINOPRIL: 10 TABLET ORAL at 10:00

## 2022-11-03 RX ADMIN — ALLOPURINOL 100 MG: 100 TABLET ORAL at 10:01

## 2022-11-03 RX ADMIN — BUDESONIDE 0.5 MG: 0.5 INHALANT ORAL at 06:30

## 2022-11-03 RX ADMIN — METHYLPREDNISOLONE SODIUM SUCCINATE 40 MG: 40 INJECTION, POWDER, FOR SOLUTION INTRAMUSCULAR; INTRAVENOUS at 21:18

## 2022-11-03 RX ADMIN — ARFORMOTEROL TARTRATE 15 MCG: 15 SOLUTION RESPIRATORY (INHALATION) at 06:30

## 2022-11-03 RX ADMIN — GUAIFENESIN 1200 MG: 600 TABLET ORAL at 21:19

## 2022-11-03 RX ADMIN — ENOXAPARIN SODIUM 40 MG: 100 INJECTION SUBCUTANEOUS at 09:59

## 2022-11-03 RX ADMIN — ATORVASTATIN CALCIUM 10 MG: 10 TABLET, FILM COATED ORAL at 10:00

## 2022-11-03 RX ADMIN — IPRATROPIUM BROMIDE AND ALBUTEROL SULFATE 3 ML: 2.5; .5 SOLUTION RESPIRATORY (INHALATION) at 00:33

## 2022-11-03 RX ADMIN — Medication 10 ML: at 09:39

## 2022-11-03 RX ADMIN — ALLOPURINOL 100 MG: 100 TABLET ORAL at 21:19

## 2022-11-03 RX ADMIN — SENNOSIDES AND DOCUSATE SODIUM 2 TABLET: 8.6; 5 TABLET ORAL at 21:19

## 2022-11-03 RX ADMIN — SODIUM CHLORIDE 125 ML/HR: 9 INJECTION, SOLUTION INTRAVENOUS at 17:13

## 2022-11-03 RX ADMIN — ARFORMOTEROL TARTRATE 15 MCG: 15 SOLUTION RESPIRATORY (INHALATION) at 20:02

## 2022-11-03 NOTE — PLAN OF CARE
Goal Outcome Evaluation:  Plan of Care Reviewed With: patient, daughter        Progress: no change  Outcome Evaluation: ADMITTED FROM ED. STABLE. GIVEN PRN NORCO FOR HEADACHE. NO OTHER COMPLAINTS. COVID RESULT STILL PENDING. WILL CONTINUE TO MONITOR.

## 2022-11-03 NOTE — PLAN OF CARE
Goal Outcome Evaluation:  Plan of Care Reviewed With: patient        Progress: no change  Outcome Evaluation: Patient presents with no physical limitations that impede her ability to return home independently.  She was encouraged to continue ambulating in the room as tolerated.  Patient may benefit from pulmonary rehab once discharged from the hospital.  Patient will be discharged from physical therapy caseload.

## 2022-11-03 NOTE — PROGRESS NOTES
[facility]   Hospitalist Progress Note  Date: 11/3/2022  Patient Name: Maritza Armstrong  : 1955  MRN: [id number]  Date of admission: 2022    Subjective   Subjective     Chief Complaint:   Shortness of breath    Summary:   Patient is a 67-year-old female the presents to the emergency room with shortness of breath, nasal congestion, nonproductive cough, vomiting and a headache.  Patient has done a course of Z-Bipin and steroids.  However, she still feels bad.  She has not had any relief with her nebulizers at home.     Patient quit smoking last Friday.     On arrival to the ED, patient had a temperature of 97.8, pulse of 63, respiratory rate 24, blood pressure 160/86, and is on 2 L of oxygen saturating 90%.  Chest x-ray shows mild bibasilar opacities.  Differential includes atelectasis, pneumonia, aspiration.     Interval Followup:   No acute events, breathing improved but still requiring supplemental oxygen    Review of Systems  No nausea vomiting or diarrhea  No chest pain or palpitations, positive shortness of breath, positive cough  No fever no chills    Objective   Objective     Vitals:   Temp:  [97.6 °F (36.4 °C)-98 °F (36.7 °C)] 97.6 °F (36.4 °C)  Heart Rate:  [56-88] 56  Resp:  [14-24] 14  BP: (115-166)/(53-86) 145/67  Flow (L/min):  [2-3] 3    Physical Exam   General appearance: NAD, conversant   Eyes: anicteric sclerae, moist conjunctivae; no lid-lag; PERRLA  HENT: Atraumatic; oropharynx clear with moist mucous membranes and no mucosal ulcerations; normal hard and soft palate  Neck: Trachea midline; FROM, supple, no thyromegaly or lymphadenopathy  Lungs: Inspiratory next Tory wheezing bilaterally, with normal respiratory effort and no intercostal retractions  CV: Regular Rate and Rhythm, no Murmurs, Rubs, or Gallops   Abdomen: Soft, non-tender; no masses or hepatosplenomegaly  Extremities: No peripheral edema or extremity lymphadenopathy  Skin: Normal temperature, turgor and texture; no rash,  ulcers or subcutaneous nodules  Psych: Appropriate affect, alert and oriented to person, place and time  Neuro: CN II - XII intact no motor deficits, no sensory deficits    Result Review    Result Review:  I have personally reviewed the results as below  [x]  Laboratory  CBC    CBC 1/18/22 11/2/22 11/3/22   WBC 11.74 (A) 15.90 (A) 14.14 (A)   RBC 5.63 (A) 5.71 (A) 5.10   Hemoglobin 18.0 (A) 18.3 (A) 16.5 (A)   Hematocrit 52.6 (A) 53.0 (A) 47.8 (A)   MCV 93.4 92.8 93.7   MCH 32.0 32.0 32.4   MCHC 34.2 34.5 34.5   RDW 12.0 (A) 13.1 13.1   Platelets 363 321 270   (A) Abnormal value            CMP    CMP 11/2/22 11/3/22   Glucose 140 (A) 147 (A)   BUN 27 (A) 29 (A)   Creatinine 0.87 0.97   Sodium 136 135 (A)   Potassium 4.0 4.1   Chloride 100 104   Calcium 9.7 9.6   Albumin 3.80    Total Bilirubin 0.5    Alkaline Phosphatase 112    AST (SGOT) 17    ALT (SGPT) 16    (A) Abnormal value       Comments are available for some flowsheets but are not being displayed.           []  Microbiology  []  Radiology  []  EKG/Telemetry   []  Cardiology/Vascular   []  Pathology  []  Old records  []  Other:    Assessment & Plan   Assessment / Plan     Assessment:  Acute exacerbation of COPD  Hypoxia  Polycythemia  Hypertension  Hyperlipidemia  History of gout  GERD    Plan:  • Patient admitted to the hospital for further work-up and management of above  • Continue bronchodilators, steroids  • Continue Zosyn, azithromycin  • COVID-19 negative, influenza negative  • Follow-up strep pneumo, Legionella  • Follow-up sputum culture  • Monitor blood counts, likely secondary to chronic cigarette use  • Continue home lisinopril  • Continue statin  • Continue allopurinol for gout  • Continue Pepcid for GERD  • Wean O2 for SPO2 greater than 88%  • If no improvement consider pulmonology consultation     Telemetry: Reviewed by me, sinus rhythm rate in the 90s    Reviewed patients labs and imaging, and discussed with patient and nurse at  bedside.    DVT prophylaxis:  Medical DVT prophylaxis orders are present.    CODE STATUS:   Code Status (Patient has no pulse and is not breathing): CPR (Attempt to Resuscitate)  Medical Interventions (Patient has pulse or is breathing): Full Support        Electronically signed by Tony Hoyos MD, 11/03/22, 9:46 AM EDT.

## 2022-11-03 NOTE — H&P
[facility]  Date: 2022   Patient Name: Maritza Armstrong  : 1955  MRN: [id number]  Primary Care Physician:  Ava Darby APRN  Date of admission: 2022    Subjective   SOB, headache  Subjective   Chief Complaint:  SOB, headache    HPI: Patient is a 67-year-old female the presents to the emergency room with shortness of breath, nasal congestion, nonproductive cough, vomiting and a headache.  Patient has done a course of Z-Bipin and steroids.  However, she still feels bad.  She has not had any relief with her nebulizers at home.    Patient quit smoking last Friday.    On arrival to the ED, patient had a temperature of 97.8, pulse of 63, respiratory rate 24, blood pressure 160/86, and is on 2 L of oxygen saturating 90%.  Chest x-ray shows mild bibasilar opacities.  Differential includes atelectasis, pneumonia, aspiration.     Personal History     Past Medical History:  Past Medical History:   Diagnosis Date   • Elevated hemoglobin (HCC)     HX OF    • Gout    • Hyperlipidemia    • Hypertension    • Low iron     HX OF, CURRENTLY TAKES IRON TABS   • PMB (postmenopausal bleeding)    • Thickened endometrium      Past Surgical History:  Past Surgical History:   Procedure Laterality Date   •  SECTION     • D & C HYSTEROSCOPY MYOSURE N/A 2/3/2022    Procedure: HYSTEROSCOPY, DILATION AND CURETTAGE;  Surgeon: Kimmy Jaffe MD;  Location: [facility] OR;  Service: Gynecology;  Laterality: N/A;   • D & C WITH SUCTION     • FINGER SURGERY Bilateral    • WISDOM TOOTH EXTRACTION         Family History:   Breast Cancer-related family history is not on file.      Social History:   Social History     Socioeconomic History   • Marital status: Single   Tobacco Use   • Smoking status: Every Day     Packs/day: 1.00     Years: 45.00     Pack years: 45.00     Types: Cigarettes   • Smokeless tobacco: Never   Vaping Use   • Vaping Use: Never used   Substance and Sexual  Activity   • Alcohol use: Not Currently   • Drug use: Never   • Sexual activity: Not Currently         Home Medications:  Vitamin D3, allopurinol, bacitracin, ferrous sulfate, ibuprofen, lisinopril-hydrochlorothiazide, and lovastatin    Allergies:  No Known Allergies    Review of Systems   All systems were reviewed and negative except for: Shortness of breath and wheezing    Objective   Objective     Vitals:   Temp:  [97.8 °F (36.6 °C)] 97.8 °F (36.6 °C)  Heart Rate:  [59-73] 62  Resp:  [17-24] 18  BP: (115-166)/(68-86) 120/68  Flow (L/min):  [2] 2    Physical Exam    Constitutional: Awake, alert, no acute distress   Eyes: Pupils equal, sclerae anicteric, no conjunctival injection   HENT: NCAT, mucous membranes moist   Neck: Supple, no thyromegaly, no lymphadenopathy, trachea midline   Respiratory: Bilateral wheezing   Cardiovascular: RRR, no murmurs, rubs, or gallops, palpable pedal pulses bilaterally   Gastrointestinal: Positive bowel sounds, soft, nontender, nondistended   Musculoskeletal: No bilateral ankle edema, no clubbing or cyanosis to extremities   Psychiatric: Appropriate affect, cooperative   Neurologic: Oriented x 3, strength symmetric in all extremities, Cranial Nerves grossly intact to confrontation, speech clear   Skin: No rashes     Result Review    Result Review:  I have personally reviewed the results from the time of this admission to 11/2/2022 23:04 EDT and agree with these findings:  [x]  Laboratory  []  Microbiology  [x]  Radiology  []  EKG/Telemetry   []  Cardiology/Vascular   []  Pathology  [x]  Old records  []  Other:      Assessment & Plan   Assessment / Plan   #1 COPD exacerbation  -Steroids, DuoNeb, Brovana, Pulmicort, Mucinex  -RT for bronchopulmonary hygiene  -Started on Zosyn and azithromycin.  -Follow COVID rule out, strep, Legionella, respiratory panel, respiratory culture    #2 polycythemia  -Could be secondary to cigarette smoking  -May need further work-up    #3 hypertension  continue lisinopril    #4 hyperlipidemia continue atorvastatin    #5 gout continue allopurinol    #6 GERD continue Pepcid      DVT prophylaxis:  Medical DVT prophylaxis orders are present.    CODE STATUS:    Code Status (Patient has no pulse and is not breathing): CPR (Attempt to Resuscitate)  Medical Interventions (Patient has pulse or is breathing): Full Support      Admission Status:  I believe this patient meets observation status.    Electronically signed by Bethany Vizcarra DO, 11/02/22, 11:04 PM EDT.

## 2022-11-03 NOTE — THERAPY EVALUATION
Acute Care - Physical Therapy Initial Evaluation   [facility]     Patient Name: Maritza Armstrong  : 1955  MRN: [id number]  Today's Date: 11/3/2022      Visit Dx:     ICD-10-CM ICD-9-CM   1. Acute respiratory failure with hypoxia (HCC)  J96.01 518.81   2. Acute bronchitis, unspecified organism  J20.9 466.0   3. COPD exacerbation (HCC)  J44.1 491.21   4. Decreased activities of daily living (ADL)  Z78.9 V49.89   5. Difficulty walking  R26.2 719.7     Patient Active Problem List   Diagnosis   • PMB (postmenopausal bleeding)   • Thickened endometrium   • Acute respiratory failure with hypoxia (HCC)     Past Medical History:   Diagnosis Date   • Elevated hemoglobin (HCC)     HX OF    • Gout    • Hyperlipidemia    • Hypertension    • Low iron     HX OF, CURRENTLY TAKES IRON TABS   • PMB (postmenopausal bleeding)    • Thickened endometrium      Past Surgical History:   Procedure Laterality Date   •  SECTION     • COLONOSCOPY     • D & C HYSTEROSCOPY MYOSURE N/A 2022    Procedure: HYSTEROSCOPY, DILATION AND CURETTAGE;  Surgeon: Kimmy Jaffe MD;  Location: [facility] MAIN OR;  Service: Gynecology;  Laterality: N/A;   • D & C WITH SUCTION     • FINGER SURGERY Bilateral    • WISDOM TOOTH EXTRACTION       PT Assessment (last 12 hours)     PT Evaluation and Treatment     Row Name 22 1600          Physical Therapy Time and Intention    Subjective Information no complaints  -CS     Document Type evaluation  -CS     Mode of Treatment individual therapy;physical therapy  -CS     Patient Effort good  -CS     Symptoms Noted During/After Treatment shortness of breath  -CS     Row Name 22 1600          General Information    Patient Profile Reviewed yes  -CS     Patient Observations alert;cooperative;agree to therapy  -CS     Prior Level of Function independent:;all household mobility;community mobility;gait;transfer;bed mobility;ADL's  -CS     Equipment Currently Used at Home none  has a straight cane  -CS      Existing Precautions/Restrictions no known precautions/restrictions  -CS     Barriers to Rehab none identified  -CS     Row Name 11/03/22 1600          Living Environment    Current Living Arrangements home  -CS     Home Accessibility stairs to enter home;stairs within home  -CS     People in Home alone  -CS     Primary Care Provided by self  -CS     Row Name 11/03/22 1600          Home Main Entrance    Number of Stairs, Main Entrance two  -CS     Stair Railings, Main Entrance railings safe and in good condition  -CS     Row Name 11/03/22 1600          Stairs Within Home, Primary    Number of Stairs, Within Home, Primary twelve  -CS     Stair Railings, Within Home, Primary railings safe and in good condition  -CS     Row Name 11/03/22 1600          Cognition    Orientation Status (Cognition) oriented x 4  -CS     Row Name 11/03/22 1600          Range of Motion Comprehensive    General Range of Motion no range of motion deficits identified  -     Row Name 11/03/22 1600          Strength Comprehensive (MMT)    General Manual Muscle Testing (MMT) Assessment no strength deficits identified  -CS     Row Name 11/03/22 1600          Bed Mobility    Bed Mobility bed mobility (all) activities  -     All Activities, [place] (Bed Mobility) independent  -CS     Row Name 11/03/22 1600          Transfers    Comment, (Transfers) Patient completing all transfers independently  -     Row Name 11/03/22 1600          Gait/Stairs (Locomotion)    Gait/Stairs Locomotion gait/ambulation independence  -     [place] Level (Gait) independent  -CS     Assistive Device (Gait) other (see comments)  None  -CS     Distance in Feet (Gait) 40  -CS     Comment, (Gait/Stairs) Patient demonstrated safety with ambulation in the room independently using no assistive device.  She continues with shortness of breath which limits mobility and ambulation distance.  -     Row Name 11/03/22 1600          Safety Issues, Functional  Mobility    Impairments Affecting Function (Mobility) shortness of breath  -     Row Name 11/03/22 1600          Balance    Balance Assessment standing dynamic balance  -CS     Dynamic Standing Balance independent  -CS     Position/Device Used, Standing Balance unsupported  -     Row Name 11/03/22 1600          Plan of Care Review    Plan of Care Reviewed With patient  -CS     Progress no change  -CS     Outcome Evaluation Patient presents with no physical limitations that impede her ability to return home independently.  She was encouraged to continue ambulating in the room as tolerated.  Patient may benefit from pulmonary rehab once discharged from the hospital.  Patient will be discharged from physical therapy caseload.  -     Row Name 11/03/22 1600          Therapy Assessment/Plan (PT)    Criteria for Skilled Interventions Met (PT) no problems identified which require skilled intervention  -CS     Therapy Frequency (PT) evaluation only  -CS     Row Name 11/03/22 1600          PT Evaluation Complexity    History, PT Evaluation Complexity no personal factors and/or comorbidities  -CS     Examination of Body Systems (PT Eval Complexity) total of 4 or more elements  -CS     Clinical Presentation (PT Evaluation Complexity) stable  -CS     Clinical Decision Making (PT Evaluation Complexity) low complexity  -CS     Overall Complexity (PT Evaluation Complexity) low complexity  -     Row Name 11/03/22 1600          Therapy Plan Review/Discharge Plan (PT)    Therapy Plan Review (PT) patient;evaluation/treatment results reviewed  -CS           User Key  (r) = Recorded By, (t) = Taken By, (c) = Cosigned By    Initials Name Provider Type    Kaia Summers, PT Physical Therapist                Physical Therapy Education     Title: PT OT SLP Therapies (In Progress)     Topic: Physical Therapy (In Progress)     Point: Mobility training (Done)     Learning Progress Summary           Patient Acceptance, E,TB, VU by MIREYA  at 11/3/2022 1643                   Point: Home exercise program (Not Started)     Learner Progress:  Not documented in this visit.          Point: Body mechanics (Not Started)     Learner Progress:  Not documented in this visit.          Point: Precautions (Done)     Learning Progress Summary           Patient Acceptance, E,TB, VU by  at 11/3/2022 1643                               User Key     Initials Effective Dates Name Provider Type Discipline     04/25/21 -  Kaia Knox PT Physical Therapist PT              PT Recommendation and Plan  Anticipated Discharge Disposition (PT): home, home with outpatient therapy services, other (see comments) (Pulmonary rehab)  Therapy Frequency (PT): evaluation only  Plan of Care Reviewed With: patient  Progress: no change  Outcome Evaluation: Patient presents with no physical limitations that impede her ability to return home independently.  She was encouraged to continue ambulating in the room as tolerated.  Patient may benefit from pulmonary rehab once discharged from the hospital.  Patient will be discharged from physical therapy caseload.   Outcome Measures     Row Name 11/03/22 1600             How much help from another person do you currently need...    Turning from your back to your side while in flat bed without using bedrails? 4  -CS      Moving from lying on back to sitting on the side of a flat bed without bedrails? 4  -CS      Moving to and from a bed to a chair (including a wheelchair)? 4  -CS      Standing up from a chair using your arms (e.g., wheelchair, bedside chair)? 4  -CS      Climbing 3-5 steps with a railing? 4  -CS      To walk in hospital room? 4  -CS      AM-PAC 6 Clicks Score (PT) 24  -CS         Functional Assessment    Outcome Measure Options AM-PAC 6 Clicks Basic Mobility (PT)  -            User Key  (r) = Recorded By, (t) = Taken By, (c) = Cosigned By    Initials Name Provider Type    CS Kaia Knox PT Physical Therapist                  Time Calculation:    PT Charges     Row Name 11/03/22 1637             Time Calculation    PT Received On 11/03/22  -CS         Untimed Charges    PT Eval/Re-eval Minutes 46  -CS         Total Minutes    Untimed Charges Total Minutes 46  -CS       Total Minutes 46  -CS            User Key  (r) = Recorded By, (t) = Taken By, (c) = Cosigned By    Initials Name Provider Type    CS Kaia Knox, PT Physical Therapist              Therapy Charges for Today     Code Description Service Date Service Provider Modifiers Qty    53185555038 HC PT EVAL LOW COMPLEXITY 4 11/3/2022 Kaia Knox, PT GP 1          PT G-Codes  Outcome Measure Options: AM-PAC 6 Clicks Basic Mobility (PT)  AM-PAC 6 Clicks Score (PT): 24  AM-PAC 6 Clicks Score (OT): 24    Kaia Knox PT  11/3/2022

## 2022-11-03 NOTE — PLAN OF CARE
Goal Outcome Evaluation:              Outcome Evaluation: Pt vss. Pt rested well during shift. Pt on 2L nc. No complaints of pain during shift. Will continue to monitor.

## 2022-11-03 NOTE — THERAPY EVALUATION
Patient Name: Maritza Armstrong  : 1955    MRN: [id number]                              Today's Date: 11/3/2022       Admit Date: 2022    Visit Dx:     ICD-10-CM ICD-9-CM   1. Acute respiratory failure with hypoxia (HCC)  J96.01 518.81   2. Acute bronchitis, unspecified organism  J20.9 466.0   3. COPD exacerbation (HCC)  J44.1 491.21   4. Decreased activities of daily living (ADL)  Z78.9 V49.89     Patient Active Problem List   Diagnosis   • PMB (postmenopausal bleeding)   • Thickened endometrium   • Acute respiratory failure with hypoxia (HCC)     Past Medical History:   Diagnosis Date   • Elevated hemoglobin (HCC)     HX OF    • Gout    • Hyperlipidemia    • Hypertension    • Low iron     HX OF, CURRENTLY TAKES IRON TABS   • PMB (postmenopausal bleeding)    • Thickened endometrium      Past Surgical History:   Procedure Laterality Date   •  SECTION     • COLONOSCOPY     • D & C HYSTEROSCOPY MYOSURE N/A 2022    Procedure: HYSTEROSCOPY, DILATION AND CURETTAGE;  Surgeon: Kimmy Jaffe MD;  Location: [facility] OR;  Service: Gynecology;  Laterality: N/A;   • D & C WITH SUCTION     • FINGER SURGERY Bilateral    • WISDOM TOOTH EXTRACTION        General Information     Row Name 22 0948          OT Time and Intention    Document Type evaluation  -PG     Mode of Treatment individual therapy;occupational therapy  -PG     Row Name 22 0948          General Information    Patient Profile Reviewed yes  -PG     Prior Level of Function independent:;transfer  -PG     Existing Precautions/Restrictions fall  -PG     Barriers to Rehab none identified  -PG     Row Name 22 0948          Occupational Profile    Reason for Services/Referral (Occupational Profile) Patient is a pleasant 67-year-old female admitted for COPD exacerbation and acute respiratory failure.  No previous OT services identified.  Patient is being evaluated to assess ADL status and determine for possible discharge needs   -PG     Row Name 11/03/22 0948          Living Environment    People in Home alone  -PG     Row Name 11/03/22 0948          Cognition    Orientation Status (Cognition) oriented x 4  -PG     Row Name 11/03/22 0948          Safety Issues, Functional Mobility    Impairments Affecting Function (Mobility) shortness of breath  -PG           User Key  (r) = Recorded By, (t) = Taken By, (c) = Cosigned By    Initials Name Provider Type    PG Jose Manuel Bernal OT Occupational Therapist                 Mobility/ADL's     Row Name 11/03/22 0949          Transfers    Transfers sit-stand transfer;stand-sit transfer  -PG     Row Name 11/03/22 0949          Bed-Chair Transfer    Bed-Chair [place] (Transfers) independent  -PG     Row Name 11/03/22 0949          Sit-Stand Transfer    Sit-Stand [place] (Transfers) independent  -PG     Row Name 11/03/22 0949          Activities of Daily Living    BADL Assessment/Intervention bathing;lower body dressing;upper body dressing;grooming;toileting  -PG     Row Name 11/03/22 0949          Bathing Assessment/Intervention    [place] Level (Bathing) bathing skills;independent  -PG     Row Name 11/03/22 0949          Upper Body Dressing Assessment/Training    [place] Level (Upper Body Dressing) upper body dressing skills;independent  -PG     Row Name 11/03/22 0949          Lower Body Dressing Assessment/Training    [place] Level (Lower Body Dressing) lower body dressing skills;independent  -PG     Row Name 11/03/22 0949          Grooming Assessment/Training    [place] Level (Grooming) grooming skills;independent  -PG     Row Name 11/03/22 0949          Toileting Assessment/Training    [place] Level (Toileting) toileting skills;independent  -PG           User Key  (r) = Recorded By, (t) = Taken By, (c) = Cosigned By    Initials Name Provider Type    PG Jose Manuel Bernal OT Occupational Therapist               Obj/Interventions     Row Name 11/03/22 0950          Sensory  Assessment (Somatosensory)    Sensory Assessment (Somatosensory) sensation intact  -PG     Row Name 11/03/22 0950          Vision Assessment/Intervention    Visual Impairment/Limitations WFL  -PG     Row Name 11/03/22 0950          Range of Motion Comprehensive    General Range of Motion no range of motion deficits identified  -PG     Row Name 11/03/22 0950          Strength Comprehensive (MMT)    General Manual Muscle Testing (MMT) Assessment no strength deficits identified  -PG     Row Name 11/03/22 0950          Motor Skills    Motor Skills coordination;functional endurance  -PG     Coordination WFL  -PG     Functional Endurance Fair plus  -PG           User Key  (r) = Recorded By, (t) = Taken By, (c) = Cosigned By    Initials Name Provider Type    PG Jose Manuel Bernal OT Occupational Therapist               Goals/Plan    No documentation.                Clinical Impression     Row Name 11/03/22 0950          Pain Assessment    Pretreatment Pain Rating 0/10 - no pain  -PG     Posttreatment Pain Rating 0/10 - no pain  -PG     Row Name 11/03/22 0950          Plan of Care Review    Plan of Care Reviewed With patient  -PG     Progress no change  -PG     Outcome Evaluation Patient currently at a independent level walking to the bathroom and with all self-care.  Patient agreeable that no additional occupational therapy is needed at this time  -PG     Row Name 11/03/22 0950          Therapy Assessment/Plan (OT)    Patient/Family Therapy Goal Statement (OT) Patient would like to return home independently  -PG     Criteria for Skilled Therapeutic Interventions Met (OT) no;no problems identified which require skilled intervention  -PG     Therapy Frequency (OT) evaluation only  -PG     Row Name 11/03/22 0950          Therapy Plan Review/Discharge Plan (OT)    Anticipated Discharge Disposition (OT) home  -PG           User Key  (r) = Recorded By, (t) = Taken By, (c) = Cosigned By    Initials Name Provider Type    RAFAEL Bernal  ARIELLE Richard Occupational Therapist               Outcome Measures     Row Name 11/03/22 0951          How much help from another is currently needed...    Putting on and taking off regular lower body clothing? 4  -PG     Bathing (including washing, rinsing, and drying) 4  -PG     Toileting (which includes using toilet bed pan or urinal) 4  -PG     Putting on and taking off regular upper body clothing 4  -PG     Taking care of personal grooming (such as brushing teeth) 4  -PG     Eating meals 4  -PG     AM-PAC 6 Clicks Score (OT) 24  -PG     Row Name 11/03/22 0000          How much help from another person do you currently need...    Turning from your back to your side while in flat bed without using bedrails? 4  -FM     Moving from lying on back to sitting on the side of a flat bed without bedrails? 4  -FM     Moving to and from a bed to a chair (including a wheelchair)? 4  -FM     Standing up from a chair using your arms (e.g., wheelchair, bedside chair)? 4  -FM     Climbing 3-5 steps with a railing? 4  -FM     To walk in hospital room? 4  -FM     AM-PAC 6 Clicks Score (PT) 24  -FM     Highest level of mobility 8 --> Walked 250 feet or more  -FM     Row Name 11/03/22 0951          Functional Assessment    Outcome Measure Options AM-PAC 6 Clicks Daily Activity (OT);Optimal Instrument  -PG     Row Name 11/03/22 0951          Optimal Instrument    Optimal Instrument Optimal - 3  -PG     Bending/Stooping 1  -PG     Standing 1  -PG     Reaching 1  -PG     From the list, choose the 3 activities you would most like to be able to do without any difficulty Bending/stooping;Standing;Reaching  -PG     Total Score Optimal - 3 3  -PG           User Key  (r) = Recorded By, (t) = Taken By, (c) = Cosigned By    Initials Name Provider Type    PG Jose Manuel Bernal OT Occupational Therapist    FM Alisha Marmolejo, RN Registered Nurse                  OT Recommendation and Plan  Therapy Frequency (OT): evaluation only  Plan of Care  Review  Plan of Care Reviewed With: patient  Progress: no change  Outcome Evaluation: Patient currently at a independent level walking to the bathroom and with all self-care.  Patient agreeable that no additional occupational therapy is needed at this time     Time Calculation:    Time Calculation- OT     Row Name 11/03/22 0952             Time Calculation- OT    OT Received On 11/03/22  -PG         Untimed Charges    OT Eval/Re-eval Minutes 30  -PG         Total Minutes    Untimed Charges Total Minutes 30  -PG       Total Minutes 30  -PG            User Key  (r) = Recorded By, (t) = Taken By, (c) = Cosigned By    Initials Name Provider Type    PG Jose Manuel Bernal OT Occupational Therapist              Therapy Charges for Today     Code Description Service Date Service Provider Modifiers Qty    58915691617 HC OT EVAL LOW COMPLEXITY 2 11/3/2022 Jose Manuel Bernal OT GO 1               Jose Manuel Bernal OT  11/3/2022

## 2022-11-03 NOTE — PLAN OF CARE
Goal Outcome Evaluation:  Plan of Care Reviewed With: patient        Progress: no change  Outcome Evaluation: Patient currently at a independent level walking to the bathroom and with all self-care.  Patient agreeable that no additional occupational therapy is needed at this time

## 2022-11-04 VITALS
HEIGHT: 66 IN | SYSTOLIC BLOOD PRESSURE: 143 MMHG | DIASTOLIC BLOOD PRESSURE: 67 MMHG | BODY MASS INDEX: 31.25 KG/M2 | WEIGHT: 194.45 LBS | TEMPERATURE: 97.9 F | HEART RATE: 75 BPM | RESPIRATION RATE: 22 BRPM | OXYGEN SATURATION: 93 %

## 2022-11-04 LAB
ANION GAP SERPL CALCULATED.3IONS-SCNC: 11.3 MMOL/L (ref 5–15)
BASOPHILS # BLD AUTO: 0.05 10*3/MM3 (ref 0–0.2)
BASOPHILS NFR BLD AUTO: 0.2 % (ref 0–1.5)
BUN SERPL-MCNC: 22 MG/DL (ref 8–23)
BUN/CREAT SERPL: 29.3 (ref 7–25)
CALCIUM SPEC-SCNC: 9.6 MG/DL (ref 8.6–10.5)
CHLORIDE SERPL-SCNC: 108 MMOL/L (ref 98–107)
CO2 SERPL-SCNC: 20.7 MMOL/L (ref 22–29)
CREAT SERPL-MCNC: 0.75 MG/DL (ref 0.57–1)
DEPRECATED RDW RBC AUTO: 45.8 FL (ref 37–54)
EGFRCR SERPLBLD CKD-EPI 2021: 87.4 ML/MIN/1.73
EOSINOPHIL # BLD AUTO: 0 10*3/MM3 (ref 0–0.4)
EOSINOPHIL NFR BLD AUTO: 0 % (ref 0.3–6.2)
ERYTHROCYTE [DISTWIDTH] IN BLOOD BY AUTOMATED COUNT: 13.2 % (ref 12.3–15.4)
GLUCOSE SERPL-MCNC: 169 MG/DL (ref 65–99)
HCT VFR BLD AUTO: 42.6 % (ref 34–46.6)
HGB BLD-MCNC: 14.5 G/DL (ref 12–15.9)
IMM GRANULOCYTES # BLD AUTO: 0.23 10*3/MM3 (ref 0–0.05)
IMM GRANULOCYTES NFR BLD AUTO: 1.1 % (ref 0–0.5)
INR PPP: 0.99 (ref 0.86–1.15)
LYMPHOCYTES # BLD AUTO: 1.33 10*3/MM3 (ref 0.7–3.1)
LYMPHOCYTES NFR BLD AUTO: 6.6 % (ref 19.6–45.3)
MAGNESIUM SERPL-MCNC: 2.1 MG/DL (ref 1.6–2.4)
MCH RBC QN AUTO: 32.2 PG (ref 26.6–33)
MCHC RBC AUTO-ENTMCNC: 34 G/DL (ref 31.5–35.7)
MCV RBC AUTO: 94.7 FL (ref 79–97)
MONOCYTES # BLD AUTO: 0.75 10*3/MM3 (ref 0.1–0.9)
MONOCYTES NFR BLD AUTO: 3.7 % (ref 5–12)
NEUTROPHILS NFR BLD AUTO: 17.72 10*3/MM3 (ref 1.7–7)
NEUTROPHILS NFR BLD AUTO: 88.4 % (ref 42.7–76)
NRBC BLD AUTO-RTO: 0 /100 WBC (ref 0–0.2)
PHOSPHATE SERPL-MCNC: 2.4 MG/DL (ref 2.5–4.5)
PLATELET # BLD AUTO: 253 10*3/MM3 (ref 140–450)
PMV BLD AUTO: 10.5 FL (ref 6–12)
POTASSIUM SERPL-SCNC: 4.1 MMOL/L (ref 3.5–5.2)
PROTHROMBIN TIME: 13.2 SECONDS (ref 11.8–14.9)
RBC # BLD AUTO: 4.5 10*6/MM3 (ref 3.77–5.28)
SODIUM SERPL-SCNC: 140 MMOL/L (ref 136–145)
WBC NRBC COR # BLD: 20.08 10*3/MM3 (ref 3.4–10.8)

## 2022-11-04 PROCEDURE — 96361 HYDRATE IV INFUSION ADD-ON: CPT

## 2022-11-04 PROCEDURE — 96376 TX/PRO/DX INJ SAME DRUG ADON: CPT

## 2022-11-04 PROCEDURE — 99217 PR OBSERVATION CARE DISCHARGE MANAGEMENT: CPT | Performed by: INTERNAL MEDICINE

## 2022-11-04 PROCEDURE — 36415 COLL VENOUS BLD VENIPUNCTURE: CPT | Performed by: HOSPITALIST

## 2022-11-04 PROCEDURE — 94760 N-INVAS EAR/PLS OXIMETRY 1: CPT

## 2022-11-04 PROCEDURE — 83735 ASSAY OF MAGNESIUM: CPT | Performed by: HOSPITALIST

## 2022-11-04 PROCEDURE — 25010000002 AMPICILLIN-SULBACTAM PER 1.5 G: Performed by: INTERNAL MEDICINE

## 2022-11-04 PROCEDURE — 96372 THER/PROPH/DIAG INJ SC/IM: CPT

## 2022-11-04 PROCEDURE — 25010000002 ENOXAPARIN PER 10 MG: Performed by: HOSPITALIST

## 2022-11-04 PROCEDURE — 85610 PROTHROMBIN TIME: CPT | Performed by: HOSPITALIST

## 2022-11-04 PROCEDURE — 85025 COMPLETE CBC W/AUTO DIFF WBC: CPT | Performed by: HOSPITALIST

## 2022-11-04 PROCEDURE — 80048 BASIC METABOLIC PNL TOTAL CA: CPT | Performed by: HOSPITALIST

## 2022-11-04 PROCEDURE — 94799 UNLISTED PULMONARY SVC/PX: CPT

## 2022-11-04 PROCEDURE — 84100 ASSAY OF PHOSPHORUS: CPT | Performed by: HOSPITALIST

## 2022-11-04 PROCEDURE — 25010000002 METHYLPREDNISOLONE PER 40 MG: Performed by: HOSPITALIST

## 2022-11-04 PROCEDURE — G0378 HOSPITAL OBSERVATION PER HR: HCPCS

## 2022-11-04 PROCEDURE — 94618 PULMONARY STRESS TESTING: CPT

## 2022-11-04 RX ORDER — IPRATROPIUM BROMIDE AND ALBUTEROL SULFATE 2.5; .5 MG/3ML; MG/3ML
3 SOLUTION RESPIRATORY (INHALATION) EVERY 6 HOURS
Qty: 360 ML | Refills: 0 | Status: SHIPPED | OUTPATIENT
Start: 2022-11-04

## 2022-11-04 RX ORDER — PREDNISONE 10 MG/1
40 TABLET ORAL DAILY
Qty: 20 TABLET | Refills: 0 | Status: SHIPPED | OUTPATIENT
Start: 2022-11-04 | End: 2022-11-09

## 2022-11-04 RX ORDER — AMOXICILLIN AND CLAVULANATE POTASSIUM 875; 125 MG/1; MG/1
1 TABLET, FILM COATED ORAL 2 TIMES DAILY
Qty: 10 TABLET | Refills: 0 | Status: SHIPPED | OUTPATIENT
Start: 2022-11-04 | End: 2022-11-09

## 2022-11-04 RX ORDER — AZITHROMYCIN 250 MG/1
500 TABLET, FILM COATED ORAL DAILY
Qty: 4 TABLET | Refills: 0 | Status: SHIPPED | OUTPATIENT
Start: 2022-11-04 | End: 2022-11-06

## 2022-11-04 RX ADMIN — SODIUM CHLORIDE 125 ML/HR: 9 INJECTION, SOLUTION INTRAVENOUS at 02:19

## 2022-11-04 RX ADMIN — BUDESONIDE 0.5 MG: 0.5 INHALANT ORAL at 06:52

## 2022-11-04 RX ADMIN — Medication 10 ML: at 08:13

## 2022-11-04 RX ADMIN — ATORVASTATIN CALCIUM 10 MG: 10 TABLET, FILM COATED ORAL at 08:13

## 2022-11-04 RX ADMIN — FERROUS SULFATE TAB 325 MG (65 MG ELEMENTAL FE) 325 MG: 325 (65 FE) TAB at 08:13

## 2022-11-04 RX ADMIN — IPRATROPIUM BROMIDE AND ALBUTEROL SULFATE 3 ML: 2.5; .5 SOLUTION RESPIRATORY (INHALATION) at 12:53

## 2022-11-04 RX ADMIN — AMPICILLIN SODIUM AND SULBACTAM SODIUM 3 G: 2; 1 INJECTION, POWDER, FOR SOLUTION INTRAMUSCULAR; INTRAVENOUS at 00:57

## 2022-11-04 RX ADMIN — AZITHROMYCIN MONOHYDRATE 500 MG: 250 TABLET ORAL at 08:13

## 2022-11-04 RX ADMIN — HYDROCODONE BITARTRATE AND ACETAMINOPHEN 1 TABLET: 5; 325 TABLET ORAL at 05:33

## 2022-11-04 RX ADMIN — ENOXAPARIN SODIUM 40 MG: 100 INJECTION SUBCUTANEOUS at 08:12

## 2022-11-04 RX ADMIN — GUAIFENESIN 1200 MG: 600 TABLET ORAL at 09:21

## 2022-11-04 RX ADMIN — IPRATROPIUM BROMIDE AND ALBUTEROL SULFATE 3 ML: 2.5; .5 SOLUTION RESPIRATORY (INHALATION) at 06:52

## 2022-11-04 RX ADMIN — FAMOTIDINE 40 MG: 20 TABLET ORAL at 08:13

## 2022-11-04 RX ADMIN — LISINOPRIL: 10 TABLET ORAL at 08:13

## 2022-11-04 RX ADMIN — ARFORMOTEROL TARTRATE 15 MCG: 15 SOLUTION RESPIRATORY (INHALATION) at 06:51

## 2022-11-04 RX ADMIN — IPRATROPIUM BROMIDE AND ALBUTEROL SULFATE 3 ML: 2.5; .5 SOLUTION RESPIRATORY (INHALATION) at 00:17

## 2022-11-04 RX ADMIN — AMPICILLIN SODIUM AND SULBACTAM SODIUM 3 G: 2; 1 INJECTION, POWDER, FOR SOLUTION INTRAMUSCULAR; INTRAVENOUS at 05:31

## 2022-11-04 RX ADMIN — METHYLPREDNISOLONE SODIUM SUCCINATE 40 MG: 40 INJECTION, POWDER, FOR SOLUTION INTRAMUSCULAR; INTRAVENOUS at 08:13

## 2022-11-04 NOTE — DISCHARGE SUMMARY
[facility]         HOSPITALIST  DISCHARGE SUMMARY    Patient Name: Maritza Armstrong  : 1955  MRN: [id number]    Date of Admission: 2022  Date of Discharge:  2022  Primary Care Physician: Ava Darby APRN    Consults     Date and Time Order Name Status Description    2022 10:07 PM Inpatient Hospitalist Consult            Active and Resolved Hospital Problems:  Acute exacerbation of COPD  Hypoxia  Polycythemia  Hypertension  Hyperlipidemia  History of gout  GERD       Hospital Course     Hospital Course:  Patient is a 67-year-old female the presents to the emergency room with shortness of breath, nasal congestion, nonproductive cough, vomiting and a headache.  Patient has done a course of Z-Bipin and steroids.  However, she still feels bad.  She has not had any relief with her nebulizers at home.     Patient quit smoking last Friday.     On arrival to the ED, patient had a temperature of 97.8, pulse of 63, respiratory rate 24, blood pressure 160/86, and is on 2 L of oxygen saturating 90%.  Chest x-ray shows mild bibasilar opacities.  Differential includes atelectasis, pneumonia, aspiration.     Patient was treated with IV antibiotics, steroids, bronchodilators.  Patient's breathing greatly improved, patient underwent 6-minute walk test on day of discharge for which she did not drop below 90% on room air.  Patient is discharged with nebulized breathing treatments, steroids, antibiotics to complete a full course.  Patient should follow-up with pulmonology, her PCP.  She is discharged home today in stable condition.    Day of Discharge     Vital Signs:  Temp:  [97.6 °F (36.4 °C)-98.5 °F (36.9 °C)] 97.9 °F (36.6 °C)  Heart Rate:  [64-90] 65  Resp:  [18-20] 20  BP: (102-153)/(51-85) 143/67  Flow (L/min):  [2-3] 3    Physical Exam:   General appearance: NAD, conversant   Eyes: anicteric sclerae, moist conjunctivae; no lid-lag; PERRLA  HENT: Atraumatic; oropharynx clear with  moist mucous membranes and no mucosal ulcerations; normal hard and soft palate  Neck: Trachea midline; FROM, supple, no thyromegaly or lymphadenopathy  Lungs: Clear to auscultation bilaterally, with normal respiratory effort and no intercostal retractions  CV: Regular Rate and Rhythm, no Murmurs, Rubs, or Gallops   Abdomen: Soft, non-tender; no masses or hepatosplenomegaly  Extremities: No peripheral edema or extremity lymphadenopathy  Skin: Normal temperature, turgor and texture; no rash, ulcers or subcutaneous nodules  Psych: Appropriate affect, alert and oriented to person, place and time  Neuro: CN II - XII intact no motor deficits, no sensory deficits    Discharge Details        Discharge Medications      New Medications      Instructions Start Date   amoxicillin-clavulanate 875-125 MG per tablet  Commonly known as: Augmentin   1 tablet, Oral, 2 Times Daily      azithromycin 250 MG tablet  Commonly known as: ZITHROMAX   500 mg, Oral, Daily      ipratropium-albuterol 0.5-2.5 mg/3 ml nebulizer  Commonly known as: DUO-NEB   3 mL, Nebulization, Every 6 Hours      predniSONE 10 MG tablet  Commonly known as: DELTASONE   40 mg, Oral, Daily         Continue These Medications      Instructions Start Date   allopurinol 100 MG tablet  Commonly known as: ZYLOPRIM   100 mg, Oral, Nightly      bacitracin 500 UNIT/GM ointment   1 application, Topical, 2 Times Daily      ferrous sulfate 140 (45 Fe) MG tablet controlled-release tablet   1 tablet, Oral, 2 Times Weekly      ibuprofen 800 MG tablet  Commonly known as: ADVIL,MOTRIN   800 mg, Oral, Every 8 Hours PRN      lisinopril-hydrochlorothiazide 10-12.5 MG per tablet  Commonly known as: PRINZIDE,ZESTORETIC   1 tablet, Oral, Nightly      lovastatin 20 MG tablet  Commonly known as: MEVACOR   20 mg, Oral, Nightly      Vitamin D3 50 MCG (2000 UT) tablet   1 tablet, Oral, Daily             No Known Allergies    Discharge Disposition:  Home or Self Care    Diet:  Hospital:  Diet  Order   Procedures   • Diet Regular       Discharge Activity:       CODE STATUS:  Code Status and Medical Interventions:   Ordered at: 11/02/22 2218     Code Status (Patient has no pulse and is not breathing):    CPR (Attempt to Resuscitate)     Medical Interventions (Patient has pulse or is breathing):    Full Support       No future appointments.    Additional Instructions for the Follow-ups that You Need to Schedule     Discharge Follow-up with PCP   As directed       Currently Documented PCP:    Ava Darby APRN    PCP Phone Number:    [phone]     Follow Up Details: 3 to 7 days         Discharge Follow-up with Specified Provider: pulmonology; 2 Weeks   As directed      To: pulmonology    Follow Up: 2 Weeks               Pertinent  and/or Most Recent Results     IMAGING:  XR Chest 1 View    Result Date: 11/2/2022  PROCEDURE: XR CHEST 1 VW  COMPARISON: [facility], , CHEST AP/PA 1 VIEW, 6/01/2016, 21:29.  INDICATIONS: short of breath, cough, vomiting, diarrhea  FINDINGS:  Mild the may reflect atelectasis though pneumonia or aspiration is not excluded.  Cardiac and mediastinal contours are within normal limits.  Regional skeleton is unremarkable.  Pleural spaces are unremarkable.        1. Mild bibasilar opacities.  Differential includes atelectasis, pneumonia or aspiration.       JEANIE YEN MD       Electronically Signed and Approved By: JEANIE YEN MD on 11/02/2022 at 16:49               LAB RESULTS:      Lab 11/04/22  0540 11/04/22  0504 11/03/22  0537 11/03/22  0028 11/02/22  2044 11/02/22  1634   WBC 20.08*  --  14.14*  --   --  15.90*   HEMOGLOBIN 14.5  --  16.5*  --   --  18.3*   HEMATOCRIT 42.6  --  47.8*  --   --  53.0*   PLATELETS 253  --  270  --   --  321   NEUTROS ABS 17.72*  --  12.54*  --   --  11.24*   IMMATURE GRANS (ABS) 0.23*  --  0.16*  --   --  0.16*   LYMPHS ABS 1.33  --  1.23  --   --  3.33*   MONOS ABS 0.75  --  0.19  --   --  1.07*   EOS ABS 0.00  --  0.00   --   --  0.04   MCV 94.7  --  93.7  --   --  92.8   LACTATE  --   --   --   --  0.4*  --    PROTIME  --  13.2 13.3 13.4  --   --          Lab 11/04/22  0504 11/03/22  0537 11/03/22  0028 11/02/22  1634   SODIUM 140 135*  --  136   POTASSIUM 4.1 4.1  --  4.0   CHLORIDE 108* 104  --  100   CO2 20.7* 21.4*  --  20.5*   ANION GAP 11.3 9.6  --  15.5*   BUN 22 29*  --  27*   CREATININE 0.75 0.97  --  0.87   EGFR 87.4 64.2  --  73.1   GLUCOSE 169* 147*  --  140*   CALCIUM 9.6 9.6  --  9.7   MAGNESIUM 2.1 2.2 2.3  --    PHOSPHORUS 2.4* 2.6 2.4*  --          Lab 11/02/22  1634   TOTAL PROTEIN 6.9   ALBUMIN 3.80   GLOBULIN 3.1   ALT (SGPT) 16   AST (SGOT) 17   BILIRUBIN 0.5   ALK PHOS 112         Lab 11/04/22  0504 11/03/22  0537 11/03/22  0028 11/02/22  1634   PROBNP  --   --   --  172.8   TROPONIN T  --   --   --  0.015   PROTIME 13.2 13.3 13.4  --    INR 0.99 1.00 1.01  --                  Brief Urine Lab Results  (Last result in the past 365 days)      Color   Clarity   Blood   Leuk Est   Nitrite   Protein   CREAT   Urine HCG        01/28/22 1421 Yellow   Clear   Negative   Negative   Negative   Negative               Microbiology Results (last 10 days)     Procedure Component Value - Date/Time    COVID-19,APTIMA PANTHER([place]),[facility], NP/OP SWAB IN UTM/VTM/SALINE TRANSPORT MEDIA,24 HR TAT - Swab, Nasal Cavity [[id number]]  (Normal) Collected: 11/02/22 2044    Lab Status: Final result Specimen: Swab from Nasal Cavity Updated: 11/03/22 0227     COVID19 Not Detected    Narrative:      Fact sheet for providers: https://www.fda.gov/media/195339/download     Fact sheet for patients: https://www.fda.gov/media/562401/download    Test performed by RT PCR.    Influenza Antigen, Rapid - Swab, Nasopharynx [[id number]]  (Normal) Collected: 11/02/22 2044    Lab Status: Final result Specimen: Swab from Nasopharynx Updated: 11/02/22 2112     Influenza A Ag, EIA Negative     Influenza B Ag, EIA Negative    Blood Culture - Blood, Arm,  Left [[id number]]  (Normal) Collected: 11/02/22 2044    Lab Status: Preliminary result Specimen: Blood from Arm, Left Updated: 11/03/22 2101     Blood Culture No growth at 24 hours    Blood Culture - Blood, Arm, Left [[id number]]  (Normal) Collected: 11/02/22 2044    Lab Status: Preliminary result Specimen: Blood from Arm, Left Updated: 11/03/22 2101     Blood Culture No growth at 24 hours                Time spent on Discharge including face to face service: Greater than 30 minutes      Electronically signed by Tony Hoyos MD, 11/04/22, 12:08 PM EDT.

## 2022-11-04 NOTE — PROGRESS NOTES
[facility]   Hospitalist Progress Note  Date: 2022  Patient Name: Maritza Armstrong  : 1955  MRN: [id number]  Date of admission: 2022    Subjective   Subjective     Chief Complaint:   Shortness of breath    Summary:   Patient is a 67-year-old female the presents to the emergency room with shortness of breath, nasal congestion, nonproductive cough, vomiting and a headache.  Patient has done a course of Z-Bipin and steroids.  However, she still feels bad.  She has not had any relief with her nebulizers at home.     Patient quit smoking last Friday.     On arrival to the ED, patient had a temperature of 97.8, pulse of 63, respiratory rate 24, blood pressure 160/86, and is on 2 L of oxygen saturating 90%.  Chest x-ray shows mild bibasilar opacities.  Differential includes atelectasis, pneumonia, aspiration.     Interval Followup:   No acute events overnight, breathing improved, patient went 6-minute walk test which was negative for any hypoxemia    Review of Systems  No nausea vomiting or diarrhea  No chest pain or palpitations, positive shortness of breath, positive cough  No fever no chills    Objective   Objective     Vitals:   Temp:  [97.6 °F (36.4 °C)-98.5 °F (36.9 °C)] 97.6 °F (36.4 °C)  Heart Rate:  [64-90] 65  Resp:  [18-22] 20  BP: (102-153)/(51-93) 128/59  Flow (L/min):  [2-3] 3    Physical Exam   General appearance: NAD, conversant   Eyes: anicteric sclerae, moist conjunctivae; no lid-lag; PERRLA  HENT: Atraumatic; oropharynx clear with moist mucous membranes and no mucosal ulcerations; normal hard and soft palate  Neck: Trachea midline; FROM, supple, no thyromegaly or lymphadenopathy  Lungs: Inspiratory next Tory wheezing bilaterally, with normal respiratory effort and no intercostal retractions  CV: Regular Rate and Rhythm, no Murmurs, Rubs, or Gallops   Abdomen: Soft, non-tender; no masses or hepatosplenomegaly  Extremities: No peripheral edema or extremity lymphadenopathy  Skin: Normal  temperature, turgor and texture; no rash, ulcers or subcutaneous nodules  Psych: Appropriate affect, alert and oriented to person, place and time  Neuro: CN II - XII intact no motor deficits, no sensory deficits    Result Review    Result Review:  I have personally reviewed the results as below  [x]  Laboratory  CBC    CBC 11/2/22 11/3/22 11/4/22   WBC 15.90 (A) 14.14 (A) 20.08 (A)   RBC 5.71 (A) 5.10 4.50   Hemoglobin 18.3 (A) 16.5 (A) 14.5   Hematocrit 53.0 (A) 47.8 (A) 42.6   MCV 92.8 93.7 94.7   MCH 32.0 32.4 32.2   MCHC 34.5 34.5 34.0   RDW 13.1 13.1 13.2   Platelets 321 270 253   (A) Abnormal value            CMP    CMP 11/2/22 11/3/22 11/4/22   Glucose 140 (A) 147 (A) 169 (A)   BUN 27 (A) 29 (A) 22   Creatinine 0.87 0.97 0.75   Sodium 136 135 (A) 140   Potassium 4.0 4.1 4.1   Chloride 100 104 108 (A)   Calcium 9.7 9.6 9.6   Albumin 3.80     Total Bilirubin 0.5     Alkaline Phosphatase 112     AST (SGOT) 17     ALT (SGPT) 16     (A) Abnormal value       Comments are available for some flowsheets but are not being displayed.           []  Microbiology  []  Radiology  []  EKG/Telemetry   []  Cardiology/Vascular   []  Pathology  []  Old records  []  Other:    Assessment & Plan   Assessment / Plan     Assessment:  Acute exacerbation of COPD  Hypoxia  Polycythemia  Hypertension  Hyperlipidemia  History of gout  GERD    Plan:  • Patient admitted to the hospital for further work-up and management of above  • Continue bronchodilators, steroids  • Continue Zosyn, azithromycin  • COVID-19 negative, influenza negative  • Follow-up strep pneumo, Legionella  • Follow-up sputum culture, pending  • Monitor blood counts, likely secondary to chronic cigarette use  • Continue home lisinopril  • Continue statin  • Continue allopurinol for gout  • Continue Pepcid for GERD  • Wean O2 for SPO2 greater than 88%  • DC IV fluids  • 6-minute walk test as above  • If no improvement consider pulmonology consultation     Telemetry:  Reviewed by me, sinus rhythm rate in the 90s    Reviewed patients labs and imaging, and discussed with patient and nurse at bedside.    DVT prophylaxis:  Medical DVT prophylaxis orders are present.    CODE STATUS:   Code Status (Patient has no pulse and is not breathing): CPR (Attempt to Resuscitate)  Medical Interventions (Patient has pulse or is breathing): Full Support        Electronically signed by Tony Hoyos MD, 11/04/22, 9:18 AM EDT.

## 2022-11-04 NOTE — PLAN OF CARE
Goal Outcome Evaluation:  Plan of Care Reviewed With: patient, daughter        Progress: no change  Outcome Evaluation: STABLE. GIVEN TYLENOL FOR PAIN. NO OTHER COMPLAINTS. WILL CONTINUE TO MONITOR.

## 2022-11-04 NOTE — NURSING NOTE
Exercise Oximetry    Patient Name:Maritza Armstrong   MRN: [id number]   Date: 11/04/22             ROOM AIR BASELINE   SpO2%    Heart Rate    Blood Pressure      EXERCISE ON ROOM AIR SpO2% EXERCISE ON O2 @  LPM SpO2%   1 MINUTE 96 1 MINUTE    2 MINUTES 94 2 MINUTES    3 MINUTES 92 3 MINUTES    4 MINUTES 91 4 MINUTES    5 MINUTES 90 5 MINUTES    6 MINUTES 91 6 MINUTES               Distance Walked   Distance Walked   Dyspnea (Grisel Scale)   Dyspnea (Grisel Scale)   Fatigue (Grisel Scale)   Fatigue (Grisel Scale)   SpO2% Post Exercise   SpO2% Post Exercise   HR Post Exercise   HR Post Exercise   Time to Recovery   Time to Recovery     Comments:

## 2022-11-05 ENCOUNTER — READMISSION MANAGEMENT (OUTPATIENT)
Dept: CALL CENTER | Facility: HOSPITAL | Age: 67
End: 2022-11-05

## 2022-11-05 NOTE — OUTREACH NOTE
Prep Survey    Flowsheet Row Responses   [facility] facility patient discharged from? [facility]   Is LACE score < 7 ? Yes   Emergency Room discharge w/ pulse ox? No   Eligibility Not Eligible  [low risk for readmit]   What are the reasons patient is not eligible? Other   Does the patient have one of the following disease processes/diagnoses(primary or secondary)? Other   Prep survey completed? Yes          RAMANA BRADLEY - Registered Nurse

## 2022-11-07 LAB
BACTERIA SPEC AEROBE CULT: NORMAL
BACTERIA SPEC AEROBE CULT: NORMAL

## 2022-11-09 LAB — QT INTERVAL: 406 MS

## 2022-12-12 ENCOUNTER — TELEPHONE (OUTPATIENT)
Dept: OBSTETRICS AND GYNECOLOGY | Facility: CLINIC | Age: 67
End: 2022-12-12

## 2022-12-12 NOTE — TELEPHONE ENCOUNTER
Caller: Maritza Armstrong A    Relationship to patient: Self    Best call back number: [phone]    Patient is needing: PATIENT STARTED VAGINALLY BLEEDING THIS MORNING. MORE THAN SPOTTING, WITH SMALL CLOTS AFTER USING THE RESTROOM. PATIENT WOULD LIKE A CALL BACK FOR REASSURANCE.  JUST TO LET HER KNOW  IT WILL BE FINE TO WAIT UNTIL SCHEDULED APPOINTMENT ON December 19.2022.

## 2022-12-12 NOTE — TELEPHONE ENCOUNTER
Patient advised to keep her appointment. She was given acute blood loss/anemia precautions and voiced understanding.

## 2022-12-16 NOTE — PROGRESS NOTES
GYN Visit    CC: PMB    HPI:   67 y.o. Contraception or HRT: Post menopausal, using no HRT      Patient is complaining of an episode of postmenopausal bleeding      History: PMHx, Meds, Allergies, PSHx, Social Hx, and POBHx all reviewed and updated.  Physical Exam   PHYSICAL EXAM:  /74   Pulse 59   Wt 89.4 kg (197 lb)   Breastfeeding No   BMI 31.80 kg/m²   General- NAD, alert and oriented, appropriate  Psych- Normal mood, good memory      External genitalia- Normal female, no lesions  Urethra/meatus- Normal, no masses, non tender, no prolapse  Bladder- Normal, no masses, non tender, no prolapse  Vagina- Normal, severe atrophy, no lesions, no discharge, no prolapse  Cvx- Normal, no lesions, no discharge, No cervical motion tenderness  Uterus- Normal size, shape & consistency.  Non tender, mobile, & no prolapse  Adnexa- No mass, non tender  Anus/Rectum/Perineum- Not performed    Lymphatic- No palpable neck, axillary, or groin nodes  Ext- No edema, no cyanosis    Skin- No lesions, no rashes, no acanthosis nigricans        ASSESSMENT AND PLAN:  Diagnoses and all orders for this visit:    1. PMB (postmenopausal bleeding) (Primary)  Assessment & Plan:  Pt states 1 week ago she woke up with some vaginal bleeding with some small clots  States she is still having a little bit of spotting  In 2022 pt had a hysteroscopy/D&C for PMB with negative pathology and no further bleeding  Was hospitalized recently for pneumonia and migraines      Orders:  -     US Pelvis Transvaginal Non OB; Future    2. Thickened endometrium  Assessment & Plan:  Had a thickened endometrium with a possible polyp in 2022.  Subsequent hysteroscopy D&C with negative pathology and no endometrial polyp in 2022                Follow Up:  Return for post ultrasound.          Kimmy Jaffe MD  2022

## 2022-12-19 ENCOUNTER — OFFICE VISIT (OUTPATIENT)
Dept: OBSTETRICS AND GYNECOLOGY | Facility: CLINIC | Age: 67
End: 2022-12-19

## 2022-12-19 VITALS
SYSTOLIC BLOOD PRESSURE: 118 MMHG | DIASTOLIC BLOOD PRESSURE: 74 MMHG | HEART RATE: 59 BPM | BODY MASS INDEX: 31.8 KG/M2 | WEIGHT: 197 LBS

## 2022-12-19 DIAGNOSIS — N95.0 PMB (POSTMENOPAUSAL BLEEDING): Primary | ICD-10-CM

## 2022-12-19 DIAGNOSIS — R93.89 THICKENED ENDOMETRIUM: ICD-10-CM

## 2022-12-19 PROCEDURE — 99213 OFFICE O/P EST LOW 20 MIN: CPT | Performed by: OBSTETRICS & GYNECOLOGY

## 2022-12-19 RX ORDER — FLUTICASONE FUROATE, UMECLIDINIUM BROMIDE AND VILANTEROL TRIFENATATE 200; 62.5; 25 UG/1; UG/1; UG/1
1 POWDER RESPIRATORY (INHALATION) DAILY
COMMUNITY
Start: 2022-12-16

## 2022-12-19 NOTE — ASSESSMENT & PLAN NOTE
Had a thickened endometrium with a possible polyp in January 2022.  Subsequent hysteroscopy D&C with negative pathology and no endometrial polyp in February 2022

## 2022-12-19 NOTE — ASSESSMENT & PLAN NOTE
Pt states 1 week ago she woke up with some vaginal bleeding with some small clots  States she is still having a little bit of spotting  In 2/2022 pt had a hysteroscopy/D&C for PMB with negative pathology and no further bleeding  Was hospitalized recently for pneumonia and migraines

## 2023-01-03 NOTE — PROGRESS NOTES
GYN Visit    CC: Postmenopausal bleeding    HPI:   67 y.o. Contraception or HRT: Post menopausal, using no HRT      Pt has no complaints today.      SCANNED - IMAGING (2022)     CLINICAL PHOTOGRAPHS - SCAN - HYSTEROSCOPY/MULTI PROCEDURE PHOTOS, JOLENE, 2022 (2022)     Tissue Pathology Exam (2022 07:59)         History: PMHx, Meds, Allergies, PSHx, Social Hx, and POBHx all reviewed and updated.  Physical Exam   PHYSICAL EXAM:  /71   Pulse 65   Wt 89.2 kg (196 lb 9.6 oz)   BMI 31.73 kg/m²   General- NAD, alert and oriented, appropriate  Psych- Normal mood, good memory      ASSESSMENT AND PLAN:  Diagnoses and all orders for this visit:    1. Thickened endometrium (Primary)  Assessment & Plan:  Pt hasn't had any further PMB since last appointment 22  Had a negative hysteroscopy/D&C 2/3/22 and operative images are linked in today's note.  There was nothing visualized in the cavity at the time of hysteroscopy/D&C  On imaging 22 there is now an intracavitary mass extending into the myometria and the mass measures 5.4 cm.  There is also some fluid within the cavity.  Discussed the change in findings since last evaluation and recommend pt see gyn/onc for second opinion as I am concerned this mass wasn't present less than a year prior    Orders:  -     Ambulatory Referral to Gynecologic Oncology    2. Endometrial mass  Assessment & Plan:  TVUS 22 revealed an intracavitary mass measuring 5.4cm and extending into the myometrium.  There was also fluid within the cavity.  On hysteroscopy/D&C 2/3/22 there were no masses in the endometrial cavity and all pathology was benign  Recommend a second opinion with gyn/onc    Orders:  -     Ambulatory Referral to Gynecologic Oncology    3. PMB (postmenopausal bleeding)  Assessment & Plan:  Pt had a single episode of recurrent PMB in early 2022  She hasn't had any further bleeding    Orders:  -     Ambulatory Referral to  Gynecologic Oncology        Follow Up:  No follow-ups on file.          Kimmy Jaffe MD  01/04/2023

## 2023-01-04 ENCOUNTER — OFFICE VISIT (OUTPATIENT)
Dept: OBSTETRICS AND GYNECOLOGY | Facility: CLINIC | Age: 68
End: 2023-01-04
Payer: MEDICARE

## 2023-01-04 VITALS
WEIGHT: 196.6 LBS | BODY MASS INDEX: 31.73 KG/M2 | SYSTOLIC BLOOD PRESSURE: 130 MMHG | HEART RATE: 65 BPM | DIASTOLIC BLOOD PRESSURE: 71 MMHG

## 2023-01-04 DIAGNOSIS — N94.89 ENDOMETRIAL MASS: ICD-10-CM

## 2023-01-04 DIAGNOSIS — N95.0 PMB (POSTMENOPAUSAL BLEEDING): ICD-10-CM

## 2023-01-04 DIAGNOSIS — R93.89 THICKENED ENDOMETRIUM: Primary | ICD-10-CM

## 2023-01-04 PROCEDURE — 99213 OFFICE O/P EST LOW 20 MIN: CPT | Performed by: OBSTETRICS & GYNECOLOGY

## 2023-01-04 PROCEDURE — 1159F MED LIST DOCD IN RCRD: CPT | Performed by: OBSTETRICS & GYNECOLOGY

## 2023-01-04 PROCEDURE — 1160F RVW MEDS BY RX/DR IN RCRD: CPT | Performed by: OBSTETRICS & GYNECOLOGY

## 2023-01-04 NOTE — ASSESSMENT & PLAN NOTE
Pt hasn't had any further PMB since last appointment 12/19/22  Had a negative hysteroscopy/D&C 2/3/22 and operative images are linked in today's note.  There was nothing visualized in the cavity at the time of hysteroscopy/D&C  On imaging 12/19/22 there is now an intracavitary mass extending into the myometria and the mass measures 5.4 cm.  There is also some fluid within the cavity.  Discussed the change in findings since last evaluation and recommend pt see gyn/onc for second opinion as I am concerned this mass wasn't present less than a year prior

## 2023-01-04 NOTE — ASSESSMENT & PLAN NOTE
TVUS 12/19/22 revealed an intracavitary mass measuring 5.4cm and extending into the myometrium.  There was also fluid within the cavity.  On hysteroscopy/D&C 2/3/22 there were no masses in the endometrial cavity and all pathology was benign  Recommend a second opinion with gyn/onc

## 2023-05-17 ENCOUNTER — TRANSCRIBE ORDERS (OUTPATIENT)
Dept: ADMINISTRATIVE | Facility: HOSPITAL | Age: 68
End: 2023-05-17
Payer: COMMERCIAL

## 2023-05-17 DIAGNOSIS — Z12.31 SCREENING MAMMOGRAM FOR BREAST CANCER: Primary | ICD-10-CM

## 2023-05-17 DIAGNOSIS — M81.8 IDIOPATHIC OSTEOPOROSIS: Primary | ICD-10-CM

## 2023-08-31 ENCOUNTER — HOSPITAL ENCOUNTER (OUTPATIENT)
Dept: BONE DENSITY | Facility: HOSPITAL | Age: 68
Discharge: HOME | End: 2023-08-31
Payer: MEDICARE

## 2023-08-31 ENCOUNTER — HOSPITAL ENCOUNTER (OUTPATIENT)
Dept: MAMMOGRAPHY | Facility: HOSPITAL | Age: 68
Discharge: HOME | End: 2023-08-31
Payer: MEDICARE

## 2023-08-31 DIAGNOSIS — M81.8 IDIOPATHIC OSTEOPOROSIS: ICD-10-CM

## 2023-08-31 DIAGNOSIS — Z12.31 SCREENING MAMMOGRAM FOR BREAST CANCER: ICD-10-CM

## 2023-08-31 PROCEDURE — 77063 BREAST TOMOSYNTHESIS BI: CPT

## 2023-08-31 PROCEDURE — 77067 SCR MAMMO BI INCL CAD: CPT

## 2023-08-31 PROCEDURE — 77080 DXA BONE DENSITY AXIAL: CPT

## 2024-01-26 ENCOUNTER — TELEPHONE (OUTPATIENT)
Dept: GASTROENTEROLOGY | Facility: CLINIC | Age: 69
End: 2024-01-26
Payer: COMMERCIAL

## 2024-01-26 NOTE — TELEPHONE ENCOUNTER
Spoke with patient in regards to a referral we received from Ava Darby for Positive colorectal cancer screening using Cologuard test. Patient schedule a DA phone appointment on 1/31/24

## 2024-01-31 ENCOUNTER — CLINICAL SUPPORT (OUTPATIENT)
Dept: GASTROENTEROLOGY | Facility: CLINIC | Age: 69
End: 2024-01-31
Payer: MEDICARE

## 2024-01-31 ENCOUNTER — PREP FOR SURGERY (OUTPATIENT)
Dept: OTHER | Facility: HOSPITAL | Age: 69
End: 2024-01-31
Payer: COMMERCIAL

## 2024-01-31 DIAGNOSIS — R19.5 POSITIVE COLORECTAL CANCER SCREENING USING COLOGUARD TEST: Primary | ICD-10-CM

## 2024-01-31 RX ORDER — RALOXIFENE HYDROCHLORIDE 60 MG/1
60 TABLET, FILM COATED ORAL DAILY
COMMUNITY

## 2024-01-31 RX ORDER — SODIUM, POTASSIUM,MAG SULFATES 17.5-3.13G
1 SOLUTION, RECONSTITUTED, ORAL ORAL EVERY 12 HOURS
Qty: 354 ML | Refills: 0 | Status: SHIPPED | OUTPATIENT
Start: 2024-01-31

## 2024-01-31 NOTE — PROGRESS NOTES
Maritza NEWTON West  1955  68 y.o.    Reason for call: Positive Cologuard  Prep prescribed: Suprep  Prep instructions reviewed with patient and sent to patient via regular mail to the home address on file  Is the patient currently on any injectable medications for weight loss or diabetes? No  Clearance needed? No  If yes, what clearance is needed? N/A  Clearance has been requested from n/a  The patient has been scheduled for: Colonoscopy  After your procedure, you will be contacted with results. Please confirm the best phone # to reach the patient: [phone]  Family history of colon cancer? No  If yes, indicate relative: n/a   Family History   Problem Relation Age of Onset    Ovarian cancer Neg Hx     Uterine cancer Neg Hx     Breast cancer Neg Hx     Colon cancer Neg Hx     Prostate cancer Neg Hx     Melanoma Neg Hx     Clotting disorder Neg Hx      Past Medical History:   Diagnosis Date    Elevated hemoglobin     HX OF     Gout     Hyperlipidemia     Hypertension     Low iron     HX OF, CURRENTLY TAKES IRON TABS    Migraine     PMB (postmenopausal bleeding)     Thickened endometrium      No Known Allergies  Past Surgical History:   Procedure Laterality Date     SECTION      COLONOSCOPY      D & C HYSTEROSCOPY MYOSURE N/A 2022    Procedure: HYSTEROSCOPY, DILATION AND CURETTAGE;  Surgeon: Kimmy Jaffe MD;  Location: [facility] OR;  Service: Gynecology;  Laterality: N/A;    D & C WITH SUCTION      FINGER SURGERY Bilateral     WISDOM TOOTH EXTRACTION       Social History     Socioeconomic History    Marital status: Single   Tobacco Use    Smoking status: Every Day     Packs/day: 1.00     Years: 45.00     Additional pack years: 0.00     Total pack years: 45.00     Types: Cigarettes    Smokeless tobacco: Never   Vaping Use    Vaping Use: Never used   Substance and Sexual Activity    Alcohol use: Not Currently    Drug use: Never    Sexual activity: Not Currently       Current Outpatient Medications:      allopurinol (ZYLOPRIM) 100 MG tablet, Take 100 mg by mouth Every Night., Disp: , Rfl:     bacitracin 500 UNIT/GM ointment, Apply 1 application topically to the appropriate area as directed 2 (Two) Times a Day., Disp: 28 g, Rfl: 0    Cholecalciferol (Vitamin D3) 50 MCG (2000 UT) tablet, Take 1 tablet by mouth Daily., Disp: , Rfl:     Erenumab-aooe (AIMOVIG SC), Inject 140 mg/mL under the skin into the appropriate area as directed., Disp: , Rfl:     ferrous sulfate 140 (45 Fe) MG tablet controlled-release tablet, Take 1 tablet by mouth 2 (Two) Times a Week., Disp: , Rfl:     ibuprofen (ADVIL,MOTRIN) 800 MG tablet, Take 800 mg by mouth Every 8 (Eight) Hours As Needed for Mild Pain ., Disp: , Rfl:     ipratropium-albuterol (DUO-NEB) 0.5-2.5 mg/3 ml nebulizer, Take 3 mL by nebulization Every 6 (Six) Hours., Disp: 360 mL, Rfl: 0    lisinopril-hydrochlorothiazide (PRINZIDE,ZESTORETIC) 10-12.5 MG per tablet, Take 1 tablet by mouth Every Night., Disp: , Rfl:     lovastatin (MEVACOR) 20 MG tablet, Take 20 mg by mouth Every Night., Disp: , Rfl:     Trelegy Ellipta 200-62.5-25 MCG/ACT aerosol powder , Inhale 1 puff Daily., Disp: , Rfl:

## 2024-02-19 ENCOUNTER — TELEPHONE (OUTPATIENT)
Dept: GASTROENTEROLOGY | Facility: CLINIC | Age: 69
End: 2024-02-19
Payer: COMMERCIAL

## 2024-02-19 NOTE — TELEPHONE ENCOUNTER
"Caller: Maritza Armstrong \"Monika\"    Relationship to patient: Self    Best call back number: [phone]    Patient is needing: PATIENT PROCEDURE IS 2-27-24 HAS NOT RECEIVED LETTER WITH PREP INSTRUCTIONS NOR CONFIRMATION OF PROCEDURE. PLEASE MAIL TO PATIENT ASAP          "

## 2024-02-20 NOTE — PRE-PROCEDURE INSTRUCTIONS
"Instructed on date and arrival time of 0730. Come to entrance \"C\". Must have  over age 18 to drive home.  May have two visitors; however, children under 12 must stay in waiting room.  Discussed clear liquid diet (no red or purple), bowel prep, and NPO.  May take medications as usual except for blood thinners, diabetic medications, and weight loss medications.  Bring list of medications.  Verbalized understanding of instructions given.  Instructed to call for questions or concerns.  "

## 2024-02-26 ENCOUNTER — ANESTHESIA EVENT (OUTPATIENT)
Dept: GASTROENTEROLOGY | Facility: HOSPITAL | Age: 69
End: 2024-02-26
Payer: MEDICARE

## 2024-02-26 NOTE — ANESTHESIA PREPROCEDURE EVALUATION
Anesthesia Evaluation     Patient summary reviewed and Nursing notes reviewed   NPO Solid Status: > 8 hours  NPO Liquid Status: > 2 hours           Airway   Mallampati: II  TM distance: >3 FB  Neck ROM: full  Possible difficult intubation and Small opening  Dental - normal exam     Comment: Caps front upper teeth       Pulmonary     breath sounds clear to auscultation  (+) a smoker Current, Smoked day of surgery, cigarettes,  Cardiovascular - normal exam  Exercise tolerance: poor (<4 METS)    ECG reviewed  Rhythm: regular  Rate: normal    (+) hypertension well controlled, hyperlipidemia      Neuro/Psych  (+) headaches  GI/Hepatic/Renal/Endo    (+) obesity    Musculoskeletal     Abdominal    Substance History      OB/GYN          Other        ROS/Med Hx Other: + cologuard     EKG 11/02/22: HR 66,   Sinus rhythm  Atrial premature complex  Probable left atrial enlargement  Consider right ventricular hypertrophy                      Anesthesia Plan    ASA 3     general   total IV anesthesia  (Total IV Anesthesia    Patient understands anesthesia not responsible for dental damage.  )  intravenous induction     Anesthetic plan, risks, benefits, and alternatives have been provided, discussed and informed consent has been obtained with: patient and child.  Pre-procedure education provided  Plan discussed with CRNA.      CODE STATUS:

## 2024-02-27 ENCOUNTER — ANESTHESIA (OUTPATIENT)
Dept: GASTROENTEROLOGY | Facility: HOSPITAL | Age: 69
End: 2024-02-27
Payer: MEDICARE

## 2024-02-27 ENCOUNTER — HOSPITAL ENCOUNTER (OUTPATIENT)
Facility: HOSPITAL | Age: 69
Setting detail: HOSPITAL OUTPATIENT SURGERY
Discharge: HOME | End: 2024-02-27
Attending: INTERNAL MEDICINE | Admitting: INTERNAL MEDICINE
Payer: MEDICARE

## 2024-02-27 VITALS
OXYGEN SATURATION: 90 % | HEART RATE: 74 BPM | SYSTOLIC BLOOD PRESSURE: 110 MMHG | TEMPERATURE: 97.3 F | DIASTOLIC BLOOD PRESSURE: 70 MMHG | WEIGHT: 204.59 LBS | RESPIRATION RATE: 22 BRPM | BODY MASS INDEX: 33.02 KG/M2

## 2024-02-27 DIAGNOSIS — R19.5 POSITIVE COLORECTAL CANCER SCREENING USING COLOGUARD TEST: ICD-10-CM

## 2024-02-27 PROCEDURE — 25010000002 GLUCAGON (RDNA) PER 1 MG: Performed by: NURSE ANESTHETIST, CERTIFIED REGISTERED

## 2024-02-27 PROCEDURE — 25810000003 LACTATED RINGERS PER 1000 ML

## 2024-02-27 PROCEDURE — 25010000002 PROPOFOL 10 MG/ML EMULSION: Performed by: NURSE ANESTHETIST, CERTIFIED REGISTERED

## 2024-02-27 PROCEDURE — 88305 TISSUE EXAM BY PATHOLOGIST: CPT | Performed by: INTERNAL MEDICINE

## 2024-02-27 DEVICE — DEV CLIP ENDO RESOLUTION360 CONTRL ROT 235CM: Type: IMPLANTABLE DEVICE | Site: ASCENDING COLON | Status: FUNCTIONAL

## 2024-02-27 RX ORDER — LIDOCAINE HYDROCHLORIDE 20 MG/ML
INJECTION, SOLUTION EPIDURAL; INFILTRATION; INTRACAUDAL; PERINEURAL AS NEEDED
Status: DISCONTINUED | OUTPATIENT
Start: 2024-02-27 | End: 2024-02-27 | Stop reason: SURG

## 2024-02-27 RX ORDER — IBUPROFEN 600 MG/1
TABLET ORAL AS NEEDED
Status: DISCONTINUED | OUTPATIENT
Start: 2024-02-27 | End: 2024-02-27 | Stop reason: SURG

## 2024-02-27 RX ORDER — SODIUM CHLORIDE, SODIUM LACTATE, POTASSIUM CHLORIDE, CALCIUM CHLORIDE 600; 310; 30; 20 MG/100ML; MG/100ML; MG/100ML; MG/100ML
30 INJECTION, SOLUTION INTRAVENOUS CONTINUOUS
Status: DISCONTINUED | OUTPATIENT
Start: 2024-02-27 | End: 2024-02-27 | Stop reason: HOSPADM

## 2024-02-27 RX ORDER — PROPOFOL 10 MG/ML
VIAL (ML) INTRAVENOUS AS NEEDED
Status: DISCONTINUED | OUTPATIENT
Start: 2024-02-27 | End: 2024-02-27 | Stop reason: SURG

## 2024-02-27 RX ADMIN — PROPOFOL 80 MG: 10 INJECTION, EMULSION INTRAVENOUS at 08:20

## 2024-02-27 RX ADMIN — GLUCAGON 0.5 MG: KIT at 08:37

## 2024-02-27 RX ADMIN — GLUCAGON 0.5 MG: KIT at 08:31

## 2024-02-27 RX ADMIN — SODIUM CHLORIDE, POTASSIUM CHLORIDE, SODIUM LACTATE AND CALCIUM CHLORIDE 30 ML/HR: 600; 310; 30; 20 INJECTION, SOLUTION INTRAVENOUS at 08:09

## 2024-02-27 RX ADMIN — PROPOFOL 200 MCG/KG/MIN: 10 INJECTION, EMULSION INTRAVENOUS at 08:20

## 2024-02-27 RX ADMIN — LIDOCAINE HYDROCHLORIDE 50 MG: 20 INJECTION, SOLUTION EPIDURAL; INFILTRATION; INTRACAUDAL; PERINEURAL at 08:20

## 2024-02-27 NOTE — ANESTHESIA POSTPROCEDURE EVALUATION
Patient: Maritza Armstrong    Procedure Summary       Date: 02/27/24 Room / Location: [facility] ENDOSCOPY 4 / [facility] ENDOSCOPY    Anesthesia Start: 0819 Anesthesia Stop: 0910    Procedure: COLONOSCOPY, WITH POLYPECTOMIES HOT AND COLD SNARE. ELEVIEW INJECTION, CLIP APPLICATION x5, TATTOO INK INJECTION Diagnosis:       Positive colorectal cancer screening using Cologuard test      (Positive colorectal cancer screening using Cologuard test [R19.5])    Surgeons: Pricila Cormier MD Provider: Andrade Lewis CRNA    Anesthesia Type: general ASA Status: 3            Anesthesia Type: general    Vitals  Vitals Value Taken Time   BP 93/56 02/27/24 0913   Temp 36.3 °C (97.3 °F) 02/27/24 0907   Pulse 62 02/27/24 0916   Resp 21 02/27/24 0912   SpO2 90 % 02/27/24 0916   Vitals shown include unfiled device data.        Post Anesthesia Care and Evaluation    Patient location during evaluation: bedside  Patient participation: complete - patient participated  Level of consciousness: awake  Pain management: adequate    Airway patency: patent  Anesthetic complications: No anesthetic complications  PONV Status: controlled  Cardiovascular status: acceptable and stable  Respiratory status: acceptable    Comments: 1378 Pt reports abdominal pain, RN to get pt up to ambulate to restroom. Dr Cormier spoke with pt and would not like pain meds given.

## 2024-02-27 NOTE — H&P
Pre Procedure History & Physical    Chief Complaint:   Positive Cologuard     Subjective     HPI:   69 yo F here for eval of positive Cologuard.    Past Medical History:   Past Medical History:   Diagnosis Date    Elevated hemoglobin     HX OF     Gout     Hyperlipidemia     Hypertension     Low iron     HX OF, CURRENTLY TAKES IRON TABS    Migraine     PMB (postmenopausal bleeding)     Thickened endometrium        Past Surgical History:  Past Surgical History:   Procedure Laterality Date     SECTION      COLONOSCOPY      D & C HYSTEROSCOPY MYOSURE N/A 2022    Procedure: HYSTEROSCOPY, DILATION AND CURETTAGE;  Surgeon: Kimmy Jaffe MD;  Location: [facility] MAIN OR;  Service: Gynecology;  Laterality: N/A;    D & C WITH SUCTION      FINGER SURGERY Bilateral     WISDOM TOOTH EXTRACTION         Family History:  Family History   Problem Relation Age of Onset    Ovarian cancer Neg Hx     Uterine cancer Neg Hx     Breast cancer Neg Hx     Colon cancer Neg Hx     Prostate cancer Neg Hx     Melanoma Neg Hx     Clotting disorder Neg Hx        Social History:   reports that she has been smoking cigarettes. She has a 45.00 pack-year smoking history. She has never used smokeless tobacco. She reports that she does not currently use alcohol. She reports that she does not use drugs.    Medications:   Medications Prior to Admission   Medication Sig Dispense Refill Last Dose    allopurinol (ZYLOPRIM) 100 MG tablet Take 1 tablet by mouth Every Night.       Cholecalciferol (Vitamin D3) 50 MCG (2000 UT) tablet Take 1 tablet by mouth Daily.       Erenumab-aooe (AIMOVIG SC) Inject 140 mg/mL under the skin into the appropriate area as directed.       ferrous sulfate 140 (45 Fe) MG tablet controlled-release tablet Take 1 tablet by mouth 2 (Two) Times a Week.       ibuprofen (ADVIL,MOTRIN) 800 MG tablet Take 1 tablet by mouth Every 8 (Eight) Hours As Needed for Mild Pain.       lisinopril-hydrochlorothiazide  (PRINZIDE,ZESTORETIC) 10-12.5 MG per tablet Take 1 tablet by mouth Every Night.       lovastatin (MEVACOR) 20 MG tablet Take 1 tablet by mouth Every Night.       raloxifene (EVISTA) 60 MG tablet Take 1 tablet by mouth Daily.       sodium-potassium-magnesium sulfates (Suprep Bowel Prep Kit) 17.5-3.13-1.6 GM/177ML solution oral solution Take 1 bottle by mouth Every 12 (Twelve) Hours. 354 mL 0     Trelegy Ellipta 200-62.5-25 MCG/ACT aerosol powder  Inhale 1 puff Daily.          Allergies:  Patient has no known allergies.    ROS:    Pertinent items are noted in HPI     Objective     Weight 92.8 kg (204 lb 9.4 oz), not currently breastfeeding.    Physical Exam   Constitutional: Pt is oriented to person, place, and time and well-developed, well-nourished, and in no distress.   Mouth/Throat: Oropharynx is clear and moist.   Neck: Normal range of motion.   Cardiovascular: Normal rate, regular rhythm and normal heart sounds.    Pulmonary/Chest: Effort normal and breath sounds normal.   Abdominal: Soft. Nontender  Skin: Skin is warm and dry.   Psychiatric: Mood, memory, affect and judgment normal.     Assessment & Plan     Diagnosis:  Positive Cologuard    Anticipated Surgical Procedure:  Colonoscopy    The risks, benefits, and alternatives of this procedure have been discussed with the patient or the responsible party- the patient understands and agrees to proceed.

## 2024-02-28 ENCOUNTER — TELEPHONE (OUTPATIENT)
Dept: GASTROENTEROLOGY | Facility: CLINIC | Age: 69
End: 2024-02-28
Payer: COMMERCIAL

## 2024-02-28 NOTE — TELEPHONE ENCOUNTER
----- Message from BRETT Guzman sent at 2/28/2024  1:49 PM EST -----  Biopsies benign.  Recall colonoscopy in 1 year.  Please send letter to patient and PCP.

## 2024-08-08 ENCOUNTER — TRANSCRIBE ORDERS (OUTPATIENT)
Dept: ADMINISTRATIVE | Facility: HOSPITAL | Age: 69
End: 2024-08-08
Payer: COMMERCIAL

## 2024-08-08 ENCOUNTER — LAB (OUTPATIENT)
Dept: LAB | Facility: HOSPITAL | Age: 69
End: 2024-08-08
Payer: MEDICARE

## 2024-08-08 DIAGNOSIS — E55.9 VITAMIN D DEFICIENCY: ICD-10-CM

## 2024-08-08 DIAGNOSIS — M81.0 AGE-RELATED OSTEOPOROSIS WITHOUT CURRENT PATHOLOGICAL FRACTURE: ICD-10-CM

## 2024-08-08 DIAGNOSIS — M15.8 OTHER POLYOSTEOARTHRITIS: ICD-10-CM

## 2024-08-08 DIAGNOSIS — E11.9 DIABETES MELLITUS WITHOUT COMPLICATION: ICD-10-CM

## 2024-08-08 DIAGNOSIS — I10 ESSENTIAL (PRIMARY) HYPERTENSION: Primary | ICD-10-CM

## 2024-08-08 DIAGNOSIS — D50.9 IRON DEFICIENCY ANEMIA, UNSPECIFIED IRON DEFICIENCY ANEMIA TYPE: ICD-10-CM

## 2024-08-08 DIAGNOSIS — R53.83 OTHER FATIGUE: ICD-10-CM

## 2024-08-08 DIAGNOSIS — R73.01 IMPAIRED FASTING GLUCOSE: ICD-10-CM

## 2024-08-08 DIAGNOSIS — E78.5 HYPERLIPIDEMIA, UNSPECIFIED HYPERLIPIDEMIA TYPE: ICD-10-CM

## 2024-08-08 DIAGNOSIS — I10 ESSENTIAL (PRIMARY) HYPERTENSION: ICD-10-CM

## 2024-08-08 DIAGNOSIS — J44.9 CHRONIC OBSTRUCTIVE PULMONARY DISEASE, UNSPECIFIED COPD TYPE: ICD-10-CM

## 2024-08-08 DIAGNOSIS — E66.9 OBESITY, UNSPECIFIED CLASSIFICATION, UNSPECIFIED OBESITY TYPE, UNSPECIFIED WHETHER SERIOUS COMORBIDITY PRESENT: ICD-10-CM

## 2024-08-08 LAB
25(OH)D3 SERPL-MCNC: 45.9 NG/ML (ref 30–100)
ALBUMIN SERPL-MCNC: 3.7 G/DL (ref 3.5–5.2)
ALBUMIN UR-MCNC: 4.6 MG/DL
ALBUMIN/GLOB SERPL: 1.5 G/DL
ALP SERPL-CCNC: 118 U/L (ref 39–117)
ALT SERPL W P-5'-P-CCNC: 11 U/L (ref 1–33)
ANION GAP SERPL CALCULATED.3IONS-SCNC: 10 MMOL/L (ref 5–15)
AST SERPL-CCNC: 12 U/L (ref 1–32)
BASOPHILS # BLD AUTO: 0.07 10*3/MM3 (ref 0–0.2)
BASOPHILS NFR BLD AUTO: 0.5 % (ref 0–1.5)
BILIRUB SERPL-MCNC: 0.2 MG/DL (ref 0–1.2)
BUN SERPL-MCNC: 13 MG/DL (ref 8–23)
BUN/CREAT SERPL: 13.7 (ref 7–25)
CALCIUM SPEC-SCNC: 9.9 MG/DL (ref 8.6–10.5)
CHLORIDE SERPL-SCNC: 107 MMOL/L (ref 98–107)
CHOLEST SERPL-MCNC: 126 MG/DL (ref 0–200)
CO2 SERPL-SCNC: 26 MMOL/L (ref 22–29)
CREAT SERPL-MCNC: 0.95 MG/DL (ref 0.57–1)
DEPRECATED RDW RBC AUTO: 40.5 FL (ref 37–54)
EGFRCR SERPLBLD CKD-EPI 2021: 65 ML/MIN/1.73
EOSINOPHIL # BLD AUTO: 0.44 10*3/MM3 (ref 0–0.4)
EOSINOPHIL NFR BLD AUTO: 3.4 % (ref 0.3–6.2)
ERYTHROCYTE [DISTWIDTH] IN BLOOD BY AUTOMATED COUNT: 11.9 % (ref 12.3–15.4)
FOLATE SERPL-MCNC: 16.5 NG/ML (ref 4.78–24.2)
GLOBULIN UR ELPH-MCNC: 2.5 GM/DL
GLUCOSE SERPL-MCNC: 146 MG/DL (ref 65–99)
HBA1C MFR BLD: 6.1 % (ref 4.8–5.6)
HCT VFR BLD AUTO: 43.5 % (ref 34–46.6)
HDLC SERPL-MCNC: 49 MG/DL (ref 40–60)
HGB BLD-MCNC: 14.9 G/DL (ref 12–15.9)
IMM GRANULOCYTES # BLD AUTO: 0.09 10*3/MM3 (ref 0–0.05)
IMM GRANULOCYTES NFR BLD AUTO: 0.7 % (ref 0–0.5)
IRON 24H UR-MRATE: 108 MCG/DL (ref 37–145)
IRON SATN MFR SERPL: 37 % (ref 20–50)
LDLC SERPL CALC-MCNC: 58 MG/DL (ref 0–100)
LDLC/HDLC SERPL: 1.16 {RATIO}
LYMPHOCYTES # BLD AUTO: 3.8 10*3/MM3 (ref 0.7–3.1)
LYMPHOCYTES NFR BLD AUTO: 29.6 % (ref 19.6–45.3)
MCH RBC QN AUTO: 32.6 PG (ref 26.6–33)
MCHC RBC AUTO-ENTMCNC: 34.3 G/DL (ref 31.5–35.7)
MCV RBC AUTO: 95.2 FL (ref 79–97)
MONOCYTES # BLD AUTO: 1.04 10*3/MM3 (ref 0.1–0.9)
MONOCYTES NFR BLD AUTO: 8.1 % (ref 5–12)
NEUTROPHILS NFR BLD AUTO: 57.7 % (ref 42.7–76)
NEUTROPHILS NFR BLD AUTO: 7.39 10*3/MM3 (ref 1.7–7)
NRBC BLD AUTO-RTO: 0 /100 WBC (ref 0–0.2)
PLATELET # BLD AUTO: 280 10*3/MM3 (ref 140–450)
PMV BLD AUTO: 11.5 FL (ref 6–12)
POTASSIUM SERPL-SCNC: 3.8 MMOL/L (ref 3.5–5.2)
PROT SERPL-MCNC: 6.2 G/DL (ref 6–8.5)
RBC # BLD AUTO: 4.57 10*6/MM3 (ref 3.77–5.28)
SODIUM SERPL-SCNC: 143 MMOL/L (ref 136–145)
T-UPTAKE NFR SERPL: 1.06 TBI (ref 0.8–1.3)
T4 SERPL-MCNC: 8.95 MCG/DL (ref 4.5–11.7)
TIBC SERPL-MCNC: 294 MCG/DL (ref 298–536)
TRANSFERRIN SERPL-MCNC: 197 MG/DL (ref 200–360)
TRIGL SERPL-MCNC: 100 MG/DL (ref 0–150)
TSH SERPL DL<=0.05 MIU/L-ACNC: 1.66 UIU/ML (ref 0.27–4.2)
VIT B12 BLD-MCNC: 543 PG/ML (ref 211–946)
VLDLC SERPL-MCNC: 19 MG/DL (ref 5–40)
WBC NRBC COR # BLD AUTO: 12.83 10*3/MM3 (ref 3.4–10.8)

## 2024-08-08 PROCEDURE — 84443 ASSAY THYROID STIM HORMONE: CPT

## 2024-08-08 PROCEDURE — 80061 LIPID PANEL: CPT

## 2024-08-08 PROCEDURE — 84436 ASSAY OF TOTAL THYROXINE: CPT

## 2024-08-08 PROCEDURE — 83036 HEMOGLOBIN GLYCOSYLATED A1C: CPT

## 2024-08-08 PROCEDURE — 82306 VITAMIN D 25 HYDROXY: CPT

## 2024-08-08 PROCEDURE — 82607 VITAMIN B-12: CPT

## 2024-08-08 PROCEDURE — 80053 COMPREHEN METABOLIC PANEL: CPT

## 2024-08-08 PROCEDURE — 36415 COLL VENOUS BLD VENIPUNCTURE: CPT

## 2024-08-08 PROCEDURE — 82746 ASSAY OF FOLIC ACID SERUM: CPT

## 2024-08-08 PROCEDURE — 84466 ASSAY OF TRANSFERRIN: CPT

## 2024-08-08 PROCEDURE — 82043 UR ALBUMIN QUANTITATIVE: CPT

## 2024-08-08 PROCEDURE — 85025 COMPLETE CBC W/AUTO DIFF WBC: CPT

## 2024-08-08 PROCEDURE — 84479 ASSAY OF THYROID (T3 OR T4): CPT

## 2024-08-08 PROCEDURE — 83540 ASSAY OF IRON: CPT

## (undated) DEVICE — SEAL HYSTERSCOPE/OUTFLOW CHANNEL MYOSURE

## (undated) DEVICE — 1000ML,PRESSURE INFUSER W/STOPCOCK: Brand: MEDLINE

## (undated) DEVICE — SLV SCD KN/LEN ADJ EXPRSS BLENDED MD 1P/U

## (undated) DEVICE — GOWN,REINFRCE,POLY,SIRUS,BREATH SLV,XXLG: Brand: MEDLINE

## (undated) DEVICE — SKIN PREP TRAY W/CHG: Brand: MEDLINE INDUSTRIES, INC.

## (undated) DEVICE — INJ SUB/MUCUS ELEVIEW FOR/GI/ENDO/PROC AMPL/10ML

## (undated) DEVICE — MARKR ENDO BLACKEYE DISP STRL

## (undated) DEVICE — TOWEL,OR,DSP,ST,BLUE,STD,4/PK,20PK/CS: Brand: MEDLINE

## (undated) DEVICE — CONN JET HYDRA H20 AUXILIARY DISP

## (undated) DEVICE — LINER SURG CANSTR SXN S/RIGD 1500CC

## (undated) DEVICE — SOL IRRG H2O PL/BG 1000ML STRL

## (undated) DEVICE — Device

## (undated) DEVICE — SNAR POLYP CAPTIFLEX XS/OVL 11X2.4MM 240CM 1P/U

## (undated) DEVICE — PAD GRND REM POLYHESIVE A/ DISP

## (undated) DEVICE — SOL IRR NACL 0.9PCT BT 1000ML

## (undated) DEVICE — TRAP POLYP ETRAP 2PK

## (undated) DEVICE — SOLIDIFIER LIQLOC PLS 1500CC BT

## (undated) DEVICE — CATH URETH INTRMIT ALLPURP LTX 16F RED

## (undated) DEVICE — GLV SURG SENSICARE PI ORTHO SZ6.5 LF STRL

## (undated) DEVICE — NDL INJ GI MIDDLE/BVL 25G 2.8MM 230CM

## (undated) DEVICE — KT VLV BIOP DEFENDO SXN AIR/WATER

## (undated) DEVICE — LITHOTOMY-YELLOW FINS: Brand: MEDLINE INDUSTRIES, INC.